# Patient Record
Sex: FEMALE | Race: WHITE | NOT HISPANIC OR LATINO | Employment: UNEMPLOYED | URBAN - METROPOLITAN AREA
[De-identification: names, ages, dates, MRNs, and addresses within clinical notes are randomized per-mention and may not be internally consistent; named-entity substitution may affect disease eponyms.]

---

## 2020-11-04 ENCOUNTER — TELEPHONE (OUTPATIENT)
Dept: NEUROLOGY | Facility: CLINIC | Age: 40
End: 2020-11-04

## 2020-11-05 ENCOUNTER — TELEPHONE (OUTPATIENT)
Dept: NEUROLOGY | Facility: CLINIC | Age: 40
End: 2020-11-05

## 2020-11-09 ENCOUNTER — TRANSCRIBE ORDERS (OUTPATIENT)
Dept: NEUROLOGY | Facility: CLINIC | Age: 40
End: 2020-11-09

## 2020-11-09 DIAGNOSIS — G40.909 EPILEPSY, UNSPECIFIED, NOT INTRACTABLE, WITHOUT STATUS EPILEPTICUS (HCC): Primary | ICD-10-CM

## 2020-11-10 ENCOUNTER — CONSULT (OUTPATIENT)
Dept: NEUROLOGY | Facility: CLINIC | Age: 40
End: 2020-11-10
Payer: COMMERCIAL

## 2020-11-10 ENCOUNTER — TELEPHONE (OUTPATIENT)
Dept: NEUROLOGY | Facility: CLINIC | Age: 40
End: 2020-11-10

## 2020-11-10 VITALS
BODY MASS INDEX: 20.03 KG/M2 | SYSTOLIC BLOOD PRESSURE: 113 MMHG | HEART RATE: 67 BPM | HEIGHT: 60 IN | DIASTOLIC BLOOD PRESSURE: 68 MMHG | TEMPERATURE: 98.2 F | WEIGHT: 102 LBS

## 2020-11-10 DIAGNOSIS — G40.B19 INTRACTABLE JUVENILE MYOCLONIC EPILEPSY WITHOUT STATUS EPILEPTICUS (HCC): ICD-10-CM

## 2020-11-10 DIAGNOSIS — F31.9 BIPOLAR I DISORDER (HCC): ICD-10-CM

## 2020-11-10 DIAGNOSIS — G40.909 EPILEPSY, UNSPECIFIED, NOT INTRACTABLE, WITHOUT STATUS EPILEPTICUS (HCC): Primary | ICD-10-CM

## 2020-11-10 DIAGNOSIS — Z96.89 S/P PLACEMENT OF VNS (VAGUS NERVE STIMULATION) DEVICE: ICD-10-CM

## 2020-11-10 DIAGNOSIS — G43.809 OTHER MIGRAINE WITHOUT STATUS MIGRAINOSUS, NOT INTRACTABLE: ICD-10-CM

## 2020-11-10 PROBLEM — N28.9 RENAL INSUFFICIENCY: Status: ACTIVE | Noted: 2019-06-11

## 2020-11-10 PROBLEM — F41.9 ANXIETY: Status: ACTIVE | Noted: 2019-09-24

## 2020-11-10 PROBLEM — H52.13 MYOPIA OF BOTH EYES: Status: ACTIVE | Noted: 2017-11-16

## 2020-11-10 PROBLEM — J45.909 ASTHMA: Status: ACTIVE | Noted: 2017-11-01

## 2020-11-10 PROBLEM — K21.9 GASTROESOPHAGEAL REFLUX DISEASE: Status: ACTIVE | Noted: 2017-11-01

## 2020-11-10 PROBLEM — G43.909 MIGRAINE: Status: ACTIVE | Noted: 2017-11-01

## 2020-11-10 PROBLEM — N18.30 STAGE 3 CHRONIC KIDNEY DISEASE (HCC): Status: ACTIVE | Noted: 2019-09-24

## 2020-11-10 PROBLEM — R80.9 PROTEINURIA: Status: ACTIVE | Noted: 2020-02-27

## 2020-11-10 PROCEDURE — 99245 OFF/OP CONSLTJ NEW/EST HI 55: CPT | Performed by: PSYCHIATRY & NEUROLOGY

## 2020-11-10 PROCEDURE — 99354 PR PROLONGED SVC OUTPATIENT SETTING 1ST HOUR: CPT | Performed by: PSYCHIATRY & NEUROLOGY

## 2020-11-10 RX ORDER — ZIPRASIDONE HYDROCHLORIDE 80 MG/1
160 CAPSULE ORAL EVERY EVENING
COMMUNITY
Start: 2020-10-23 | End: 2021-09-02

## 2020-11-10 RX ORDER — ECHINACEA PURPUREA EXTRACT 125 MG
2 TABLET ORAL AS NEEDED
COMMUNITY
Start: 2019-12-23

## 2020-11-10 RX ORDER — PERAMPANEL 4 MG/1
4 TABLET ORAL EVERY EVENING
Qty: 30 TABLET | Refills: 5 | Status: SHIPPED | OUTPATIENT
Start: 2020-11-10 | End: 2021-03-22

## 2020-11-10 RX ORDER — LAMOTRIGINE 200 MG/1
300 TABLET ORAL 2 TIMES DAILY
COMMUNITY
Start: 2020-11-05 | End: 2020-11-10

## 2020-11-10 RX ORDER — B-COMPLEX WITH VITAMIN C
1 TABLET ORAL
COMMUNITY
Start: 2020-09-04

## 2020-11-10 RX ORDER — ESOMEPRAZOLE MAGNESIUM 40 MG/1
40 CAPSULE, DELAYED RELEASE ORAL AS NEEDED
COMMUNITY
Start: 2020-11-03

## 2020-11-10 RX ORDER — LAMOTRIGINE 200 MG/1
200 TABLET ORAL 2 TIMES DAILY
Qty: 60 TABLET | Refills: 5 | Status: SHIPPED | OUTPATIENT
Start: 2020-11-10 | End: 2020-12-04

## 2020-11-10 RX ORDER — ZIPRASIDONE HYDROCHLORIDE 60 MG/1
160 CAPSULE ORAL
COMMUNITY
Start: 2020-11-06 | End: 2021-03-22

## 2020-11-10 RX ORDER — ZONISAMIDE 100 MG/1
CAPSULE ORAL 2 TIMES DAILY
COMMUNITY
Start: 2020-10-23 | End: 2020-11-10 | Stop reason: SDUPTHER

## 2020-11-10 RX ORDER — ZONISAMIDE 100 MG/1
200 CAPSULE ORAL 2 TIMES DAILY
Qty: 120 CAPSULE | Refills: 5 | Status: SHIPPED | OUTPATIENT
Start: 2020-11-10 | End: 2021-01-25

## 2020-11-10 RX ORDER — OMEPRAZOLE 40 MG/1
40 CAPSULE, DELAYED RELEASE ORAL
COMMUNITY
End: 2020-11-10

## 2020-11-10 RX ORDER — PSEUDOEPHED/ACETAMINOPH/DIPHEN 30MG-500MG
1000 TABLET ORAL EVERY 4 HOURS PRN
COMMUNITY
Start: 2020-09-01

## 2020-11-10 RX ORDER — DIVALPROEX SODIUM 500 MG/1
500 TABLET, EXTENDED RELEASE ORAL
Qty: 30 TABLET | Refills: 5 | Status: SHIPPED | OUTPATIENT
Start: 2020-11-10 | End: 2021-03-22 | Stop reason: SDUPTHER

## 2020-11-10 RX ORDER — PERAMPANEL 4 MG/1
4 TABLET ORAL EVERY EVENING
COMMUNITY
Start: 2020-10-30 | End: 2020-11-10

## 2020-11-10 RX ORDER — MEGESTROL ACETATE 40 MG/ML
10 SUSPENSION ORAL
COMMUNITY
End: 2020-11-10

## 2020-11-23 ENCOUNTER — TELEPHONE (OUTPATIENT)
Dept: NEUROLOGY | Facility: CLINIC | Age: 40
End: 2020-11-23

## 2020-11-25 DIAGNOSIS — Z96.89 S/P PLACEMENT OF VNS (VAGUS NERVE STIMULATION) DEVICE: ICD-10-CM

## 2020-11-25 DIAGNOSIS — G40.B19 INTRACTABLE JUVENILE MYOCLONIC EPILEPSY WITHOUT STATUS EPILEPTICUS (HCC): Primary | ICD-10-CM

## 2020-12-01 ENCOUNTER — APPOINTMENT (OUTPATIENT)
Dept: RADIOLOGY | Facility: CLINIC | Age: 40
End: 2020-12-01
Payer: COMMERCIAL

## 2020-12-01 ENCOUNTER — TRANSCRIBE ORDERS (OUTPATIENT)
Dept: LAB | Facility: CLINIC | Age: 40
End: 2020-12-01

## 2020-12-01 ENCOUNTER — APPOINTMENT (OUTPATIENT)
Dept: LAB | Facility: CLINIC | Age: 40
End: 2020-12-01
Payer: COMMERCIAL

## 2020-12-01 DIAGNOSIS — Z96.89 S/P PLACEMENT OF VNS (VAGUS NERVE STIMULATION) DEVICE: ICD-10-CM

## 2020-12-01 DIAGNOSIS — G40.B19 INTRACTABLE JUVENILE MYOCLONIC EPILEPSY WITHOUT STATUS EPILEPTICUS (HCC): ICD-10-CM

## 2020-12-01 DIAGNOSIS — E78.2 MODERATE MIXED HYPERLIPIDEMIA NOT REQUIRING STATIN THERAPY: Primary | ICD-10-CM

## 2020-12-01 DIAGNOSIS — G40.909 EPILEPSY, UNSPECIFIED, NOT INTRACTABLE, WITHOUT STATUS EPILEPTICUS (HCC): ICD-10-CM

## 2020-12-01 DIAGNOSIS — E78.2 MODERATE MIXED HYPERLIPIDEMIA NOT REQUIRING STATIN THERAPY: ICD-10-CM

## 2020-12-01 LAB
ALBUMIN SERPL BCP-MCNC: 4.3 G/DL (ref 3.5–5)
ALP SERPL-CCNC: 143 U/L (ref 46–116)
ALT SERPL W P-5'-P-CCNC: 65 U/L (ref 12–78)
ANION GAP SERPL CALCULATED.3IONS-SCNC: 4 MMOL/L (ref 4–13)
AST SERPL W P-5'-P-CCNC: 21 U/L (ref 5–45)
BILIRUB SERPL-MCNC: 0.32 MG/DL (ref 0.2–1)
BUN SERPL-MCNC: 12 MG/DL (ref 5–25)
CALCIUM SERPL-MCNC: 9.9 MG/DL (ref 8.3–10.1)
CHLORIDE SERPL-SCNC: 108 MMOL/L (ref 100–108)
CHOLEST SERPL-MCNC: 191 MG/DL (ref 50–200)
CO2 SERPL-SCNC: 26 MMOL/L (ref 21–32)
CREAT SERPL-MCNC: 1.05 MG/DL (ref 0.6–1.3)
GFR SERPL CREATININE-BSD FRML MDRD: 68 ML/MIN/1.73SQ M
GLUCOSE P FAST SERPL-MCNC: 91 MG/DL (ref 65–99)
HDLC SERPL-MCNC: 60 MG/DL
LDLC SERPL CALC-MCNC: 112 MG/DL (ref 0–100)
NONHDLC SERPL-MCNC: 131 MG/DL
POTASSIUM SERPL-SCNC: 3.8 MMOL/L (ref 3.5–5.3)
PROT SERPL-MCNC: 7.7 G/DL (ref 6.4–8.2)
SODIUM SERPL-SCNC: 138 MMOL/L (ref 136–145)
TRIGL SERPL-MCNC: 96 MG/DL

## 2020-12-01 PROCEDURE — 36415 COLL VENOUS BLD VENIPUNCTURE: CPT

## 2020-12-01 PROCEDURE — 80165 DIPROPYLACETIC ACID FREE: CPT

## 2020-12-01 PROCEDURE — 80053 COMPREHEN METABOLIC PANEL: CPT

## 2020-12-01 PROCEDURE — 72050 X-RAY EXAM NECK SPINE 4/5VWS: CPT

## 2020-12-01 PROCEDURE — 80061 LIPID PANEL: CPT

## 2020-12-01 PROCEDURE — 80175 DRUG SCREEN QUAN LAMOTRIGINE: CPT

## 2020-12-03 LAB
LAMOTRIGINE SERPL-MCNC: 21 UG/ML (ref 2–20)
VALPROATE FREE SERPL-MCNC: 6.3 UG/ML (ref 6–22)

## 2020-12-04 DIAGNOSIS — G40.B19 INTRACTABLE JUVENILE MYOCLONIC EPILEPSY WITHOUT STATUS EPILEPTICUS (HCC): Primary | ICD-10-CM

## 2020-12-04 RX ORDER — LAMOTRIGINE 150 MG/1
TABLET ORAL
Qty: 60 TABLET | Refills: 5 | Status: SHIPPED | OUTPATIENT
Start: 2020-12-04 | End: 2021-03-22 | Stop reason: SDUPTHER

## 2020-12-08 ENCOUNTER — HOSPITAL ENCOUNTER (OUTPATIENT)
Dept: RADIOLOGY | Age: 40
Discharge: HOME | End: 2020-12-08
Payer: COMMERCIAL

## 2020-12-08 DIAGNOSIS — G40.B19 INTRACTABLE JUVENILE MYOCLONIC EPILEPSY WITHOUT STATUS EPILEPTICUS (HCC): ICD-10-CM

## 2020-12-08 PROCEDURE — 70553 MRI BRAIN STEM W/O & W/DYE: CPT

## 2020-12-08 PROCEDURE — A9585 GADOBUTROL INJECTION: HCPCS | Performed by: PSYCHIATRY & NEUROLOGY

## 2020-12-08 PROCEDURE — G1004 CDSM NDSC: HCPCS

## 2020-12-08 RX ADMIN — GADOBUTROL 4 ML: 604.72 INJECTION INTRAVENOUS at 13:10

## 2020-12-09 ENCOUNTER — TELEPHONE (OUTPATIENT)
Dept: NEUROLOGY | Facility: CLINIC | Age: 40
End: 2020-12-09

## 2020-12-10 ENCOUNTER — TELEPHONE (OUTPATIENT)
Dept: NEUROLOGY | Facility: CLINIC | Age: 40
End: 2020-12-10

## 2020-12-18 ENCOUNTER — TELEPHONE (OUTPATIENT)
Dept: NEUROLOGY | Facility: CLINIC | Age: 40
End: 2020-12-18

## 2020-12-18 ENCOUNTER — LAB (OUTPATIENT)
Dept: LAB | Facility: CLINIC | Age: 40
End: 2020-12-18
Payer: COMMERCIAL

## 2020-12-18 DIAGNOSIS — G40.B19 INTRACTABLE JUVENILE MYOCLONIC EPILEPSY WITHOUT STATUS EPILEPTICUS (HCC): ICD-10-CM

## 2020-12-18 PROCEDURE — 80165 DIPROPYLACETIC ACID FREE: CPT

## 2020-12-18 PROCEDURE — 80203 DRUG SCREEN QUANT ZONISAMIDE: CPT

## 2020-12-18 PROCEDURE — 36415 COLL VENOUS BLD VENIPUNCTURE: CPT

## 2020-12-18 PROCEDURE — 80175 DRUG SCREEN QUAN LAMOTRIGINE: CPT

## 2020-12-21 LAB
LAMOTRIGINE SERPL-MCNC: 14 UG/ML (ref 2–20)
VALPROATE FREE SERPL-MCNC: 4.9 UG/ML (ref 6–22)
ZONISAMIDE SERPL-MCNC: 29.2 UG/ML (ref 10–40)

## 2020-12-22 ENCOUNTER — TELEPHONE (OUTPATIENT)
Dept: NEUROLOGY | Facility: CLINIC | Age: 40
End: 2020-12-22

## 2021-01-11 ENCOUNTER — TELEPHONE (OUTPATIENT)
Dept: NEUROLOGY | Facility: CLINIC | Age: 41
End: 2021-01-11

## 2021-01-11 NOTE — TELEPHONE ENCOUNTER
Called and spoke to patient -  patient confirmed apt with Dr Yaritza Hyman  Patient has no issues or concerns at this time

## 2021-01-25 ENCOUNTER — OFFICE VISIT (OUTPATIENT)
Dept: NEUROLOGY | Facility: CLINIC | Age: 41
End: 2021-01-25
Payer: COMMERCIAL

## 2021-01-25 VITALS
HEIGHT: 59 IN | BODY MASS INDEX: 23.67 KG/M2 | RESPIRATION RATE: 18 BRPM | WEIGHT: 117.4 LBS | DIASTOLIC BLOOD PRESSURE: 72 MMHG | HEART RATE: 79 BPM | SYSTOLIC BLOOD PRESSURE: 113 MMHG

## 2021-01-25 DIAGNOSIS — G43.809 OTHER MIGRAINE WITHOUT STATUS MIGRAINOSUS, NOT INTRACTABLE: ICD-10-CM

## 2021-01-25 DIAGNOSIS — G40.B19 INTRACTABLE JUVENILE MYOCLONIC EPILEPSY WITHOUT STATUS EPILEPTICUS (HCC): Primary | ICD-10-CM

## 2021-01-25 DIAGNOSIS — N28.9 RENAL INSUFFICIENCY: ICD-10-CM

## 2021-01-25 DIAGNOSIS — G40.909 EPILEPSY, UNSPECIFIED, NOT INTRACTABLE, WITHOUT STATUS EPILEPTICUS (HCC): ICD-10-CM

## 2021-01-25 DIAGNOSIS — F31.9 BIPOLAR I DISORDER (HCC): ICD-10-CM

## 2021-01-25 PROCEDURE — 99214 OFFICE O/P EST MOD 30 MIN: CPT | Performed by: PSYCHIATRY & NEUROLOGY

## 2021-01-25 RX ORDER — ZONISAMIDE 100 MG/1
CAPSULE ORAL
Qty: 84 CAPSULE | Refills: 0 | Status: SHIPPED | OUTPATIENT
Start: 2021-01-25 | End: 2021-03-22

## 2021-01-25 RX ORDER — DIVALPROEX SODIUM 250 MG/1
250 TABLET, EXTENDED RELEASE ORAL
Qty: 30 TABLET | Refills: 5 | Status: SHIPPED | OUTPATIENT
Start: 2021-01-25 | End: 2021-03-22
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
## 2021-01-25 NOTE — PATIENT INSTRUCTIONS
Plan:   1 - Take Divalproex (Depakote) ER 500mg and 250mg one tab each at bedtime  2 - Continue with lamotrigine at 150mg twice a day  3 - continue with Fycompa 4mg tab 1 tab at bedtime  4 - decrease Zonisamide to three 100mg caps at bedtime for 2 weeks, then two caps at bedtime for 2 weeks, then one cap at bedtime for 2 weeks then stop  5 - in one month check lamotrigine and valproic acid free level  6 - call the office if you have breakthrough seizures, if so will increase divalproex dose  7 - follow-up in 2 months with SHANNAN and Dr Tejal Crespo in 4 months

## 2021-01-26 PROBLEM — G40.909 EPILEPSY, UNSPECIFIED, NOT INTRACTABLE, WITHOUT STATUS EPILEPTICUS (HCC): Status: RESOLVED | Noted: 2021-01-26 | Resolved: 2021-01-26

## 2021-01-26 PROBLEM — G40.909 EPILEPSY, UNSPECIFIED, NOT INTRACTABLE, WITHOUT STATUS EPILEPTICUS (HCC): Status: ACTIVE | Noted: 2021-01-26

## 2021-02-15 ENCOUNTER — TELEPHONE (OUTPATIENT)
Dept: NEUROLOGY | Facility: CLINIC | Age: 41
End: 2021-02-15

## 2021-02-15 DIAGNOSIS — R10.9 ABDOMINAL PAIN: ICD-10-CM

## 2021-02-15 DIAGNOSIS — G40.B19 INTRACTABLE JUVENILE MYOCLONIC EPILEPSY WITHOUT STATUS EPILEPTICUS (HCC): Primary | ICD-10-CM

## 2021-02-15 NOTE — TELEPHONE ENCOUNTER
Pt called and states that Dr Nhan Vang increased her depakote from 500 mg to 750 mg at bedtime  For the past 2 weeks, she has not been feeling well, extremely weak and tired  1 5 week ago, her stomach started to hurt  Since Dr Nhan Vang increased depakote, her symptoms has been constant  Current meds  depakote 750 mg daily   zonisamide 100 mg 2 tab bedtime (week 2)  Lamictal 150 mg bid  fycompa 4 mg at bedtime    No episode of seizure  No recent illness  No new meds or other med changes   Requesting to speak w/ either Dr Nhan Vang or Claude Brunt                               [phone] ok to leave detailed message but "I will answer"

## 2021-02-15 NOTE — TELEPHONE ENCOUNTER
Please ask if she can go for blood work to check liver function, CBC, amylase, lipase, along with the previously written scripts for lamotrigine and valproic acid levels  Try to get blood work completed first   After blood work is drawn then she can decrease Divalproex back to 500mg at bedtime  I want to see how high is her lamotrigine level  If lamotrigine is excessively high then will reduce dose of lamotrigine if we try to increase Divalproex again  She may continue to reduce her dose of zonisamide as previously instructed

## 2021-02-16 ENCOUNTER — TELEPHONE (OUTPATIENT)
Dept: NEUROLOGY | Facility: CLINIC | Age: 41
End: 2021-02-16

## 2021-02-16 NOTE — TELEPHONE ENCOUNTER
Patient called the [facility] to request to speak to Dr Dina Mcclelland regarding her concerns with her Depakote dosage  She stated her Depakote was increased to 750 mg daily but for the past 2 weeks it has caused her to have abdominal pain so she is only taking 500 mg daily  I explained to her that Dr Dina Mcclelland had put in orders for lab work that he wanted her to have done which is a factor to reviewing her medication dosage levels  She agreed to going and having her lab work done

## 2021-02-16 NOTE — TELEPHONE ENCOUNTER
Patient called after hours line  Patient will go and get her labs done tomorrow  Patient also admits that she is likely more dehydrated and does not eat with her morning medications  Advised to drink more water and less soda and that she could eat something small when taking her morning medication  Patient verbalized understanding  All patient questions answered and I advised we would f/u when her labs come in      Dr Lazaro Smith, [facility]

## 2021-02-17 ENCOUNTER — LAB (OUTPATIENT)
Dept: LAB | Facility: CLINIC | Age: 41
End: 2021-02-17
Payer: COMMERCIAL

## 2021-02-17 DIAGNOSIS — R10.9 ABDOMINAL PAIN: ICD-10-CM

## 2021-02-17 DIAGNOSIS — G40.B19 INTRACTABLE JUVENILE MYOCLONIC EPILEPSY WITHOUT STATUS EPILEPTICUS (HCC): ICD-10-CM

## 2021-02-17 DIAGNOSIS — G40.909 EPILEPSY, UNSPECIFIED, NOT INTRACTABLE, WITHOUT STATUS EPILEPTICUS (HCC): ICD-10-CM

## 2021-02-17 LAB
ALBUMIN SERPL BCP-MCNC: 4.1 G/DL (ref 3.5–5)
ALP SERPL-CCNC: 99 U/L (ref 46–116)
ALT SERPL W P-5'-P-CCNC: 29 U/L (ref 12–78)
AMYLASE SERPL-CCNC: 64 IU/L (ref 25–115)
AST SERPL W P-5'-P-CCNC: 26 U/L (ref 5–45)
BILIRUB DIRECT SERPL-MCNC: 0.07 MG/DL (ref 0–0.2)
BILIRUB SERPL-MCNC: 0.24 MG/DL (ref 0.2–1)
ERYTHROCYTE [DISTWIDTH] IN BLOOD BY AUTOMATED COUNT: 14 % (ref 11.6–15.1)
HCT VFR BLD AUTO: 39.8 % (ref 34.8–46.1)
HGB BLD-MCNC: 12.6 G/DL (ref 11.5–15.4)
LIPASE SERPL-CCNC: 107 U/L (ref 73–393)
MCH RBC QN AUTO: 29.7 PG (ref 26.8–34.3)
MCHC RBC AUTO-ENTMCNC: 31.7 G/DL (ref 31.4–37.4)
MCV RBC AUTO: 94 FL (ref 82–98)
PLATELET # BLD AUTO: 237 THOUSANDS/UL (ref 149–390)
PMV BLD AUTO: 10.2 FL (ref 8.9–12.7)
PROT SERPL-MCNC: 7.2 G/DL (ref 6.4–8.2)
RBC # BLD AUTO: 4.24 MILLION/UL (ref 3.81–5.12)
WBC # BLD AUTO: 9.03 THOUSAND/UL (ref 4.31–10.16)

## 2021-02-17 PROCEDURE — 82150 ASSAY OF AMYLASE: CPT

## 2021-02-17 PROCEDURE — 85027 COMPLETE CBC AUTOMATED: CPT

## 2021-02-17 PROCEDURE — 80175 DRUG SCREEN QUAN LAMOTRIGINE: CPT

## 2021-02-17 PROCEDURE — 36415 COLL VENOUS BLD VENIPUNCTURE: CPT

## 2021-02-17 PROCEDURE — 80165 DIPROPYLACETIC ACID FREE: CPT

## 2021-02-17 PROCEDURE — 83690 ASSAY OF LIPASE: CPT

## 2021-02-17 PROCEDURE — 80076 HEPATIC FUNCTION PANEL: CPT

## 2021-02-19 LAB
LAMOTRIGINE SERPL-MCNC: 11.2 UG/ML (ref 2–20)
VALPROATE FREE SERPL-MCNC: 5.6 UG/ML (ref 6–22)

## 2021-02-19 NOTE — TELEPHONE ENCOUNTER
Pt called and states that she got bw done 2 days ago  Advised pt that lamotrigine and depakote level still in process  Requesting a call back w/ final results       See lab results        [phone] ok to leave detailed message

## 2021-02-22 NOTE — TELEPHONE ENCOUNTER
LFT and enzymes are normal   Lamotrigine level is not excessive  Valproic acid level is still low; not significantly high compared to prior dose  Component      Latest Ref Rng & Units 12/1/2020 12/18/2020 2/17/2021   Valproic Acid, Free      6 0 - 22 0 ug/mL 6 3  5 6 (L)   LAMOTRIGINE LEVEL      2 0 - 20 0 ug/mL 21 0 (HH) 14 0 11 2       Did she reduce her Divalproex to 500mg at bedtime? Is she feeling better? Is there still abdominal pain? If she is feeling better, we can try her going back up on Depakote to 750mg daily  No change to lamotrigine dose  May continue to wean off of zonisamide  Let us know if abdominal pain returns after divalproex dose has been increased

## 2021-02-22 NOTE — TELEPHONE ENCOUNTER
pt made aware  she did reduced divalproex to 500mg bedtime  she is feeling better  no abdominal pain  all side effects resolved once she decreased to 500mg bedtime  her sister told her that when she previously increased depakote years ago, she had has these same symptoms and she had seizures on higher dose  she states that after she decreased dose last week  she started to space out again  occuring 2-3 times a day, not every day  episodes last 10-20 sec   occured maybe 3 days in the last week  No missed  she is not comfortable increasing depakote again as she does not want to have the side effects again     depakote er 500mg 1 tab hs  lamotrigine 150mg 1 tab bid   zonisamide 100mg 2 tabs hs- she will decrease to 100mg 1 tab hs this thursday  please advise

## 2021-02-22 NOTE — TELEPHONE ENCOUNTER
I would like her to try a higher dose of Divalproex again, given the recurrent seizures she has been experiencing  She may feel better once zonisamide is out of her system  She can try decreasing zonisamide to 100mg today/Tuesday for 5 days, then stop zonisamide sooner than expected and restart Divalproex 750mg at bedtime  If she had blood work on 2/17/2021 with the higher dose of Divalproex, then her level is actually lower than before

## 2021-02-23 NOTE — TELEPHONE ENCOUNTER
Spoke to patient  She is not willing to increase depakote due to prior events and how she is feeling  States she will stay on 500mg dose but will come off zonisamide  Informed patient that this will likely not control seizures, beings her levels are below the normal expected range  Patient verbalized understanding but states she does not want to increase depakote any higher than what she already is taking  States she will call our office if she has any additional seizures  Dr Mervin HARVEY

## 2021-03-17 ENCOUNTER — TELEPHONE (OUTPATIENT)
Dept: NEUROLOGY | Facility: CLINIC | Age: 41
End: 2021-03-17

## 2021-03-17 NOTE — TELEPHONE ENCOUNTER
Called and spoke to patient -  patient confirmed upcoming apt with Dr Jeffries on 3/22/21 @ 9:30 am  Patient has no issues or concerns at this time

## 2021-03-22 ENCOUNTER — OFFICE VISIT (OUTPATIENT)
Dept: NEUROLOGY | Facility: CLINIC | Age: 41
End: 2021-03-22
Payer: COMMERCIAL

## 2021-03-22 VITALS
BODY MASS INDEX: 23.29 KG/M2 | RESPIRATION RATE: 18 BRPM | HEART RATE: 78 BPM | SYSTOLIC BLOOD PRESSURE: 116 MMHG | HEIGHT: 60 IN | DIASTOLIC BLOOD PRESSURE: 67 MMHG | WEIGHT: 118.6 LBS

## 2021-03-22 DIAGNOSIS — G40.909 EPILEPSY, UNSPECIFIED, NOT INTRACTABLE, WITHOUT STATUS EPILEPTICUS (HCC): ICD-10-CM

## 2021-03-22 DIAGNOSIS — F31.9 BIPOLAR I DISORDER (HCC): ICD-10-CM

## 2021-03-22 DIAGNOSIS — G40.B19 INTRACTABLE JUVENILE MYOCLONIC EPILEPSY WITHOUT STATUS EPILEPTICUS (HCC): Primary | ICD-10-CM

## 2021-03-22 DIAGNOSIS — G43.809 OTHER MIGRAINE WITHOUT STATUS MIGRAINOSUS, NOT INTRACTABLE: ICD-10-CM

## 2021-03-22 PROCEDURE — 99215 OFFICE O/P EST HI 40 MIN: CPT | Performed by: PSYCHIATRY & NEUROLOGY

## 2021-03-22 RX ORDER — LAMOTRIGINE 150 MG/1
TABLET ORAL
Qty: 60 TABLET | Refills: 5 | Status: SHIPPED | OUTPATIENT
Start: 2021-03-22 | End: 2021-09-13 | Stop reason: SDUPTHER

## 2021-03-22 RX ORDER — DIVALPROEX SODIUM 500 MG/1
500 TABLET, EXTENDED RELEASE ORAL
Qty: 30 TABLET | Refills: 5 | Status: SHIPPED | OUTPATIENT
Start: 2021-03-22 | End: 2021-09-13 | Stop reason: SDUPTHER

## 2021-03-22 RX ORDER — PERAMPANEL 2 MG/1
1 TABLET ORAL
Qty: 30 TABLET | Refills: 5 | Status: SHIPPED | OUTPATIENT
Start: 2021-03-22 | End: 2021-05-26

## 2021-03-22 NOTE — PATIENT INSTRUCTIONS
Plan:   1 - Continue with Divalproex ER 500mg daily at bedtime  2 - Continue with lamotrigine at 150mg twice a day  3 - continue with Fycompa 4mg tab 1 tab at bedtime until no more then change to Fycompa 2mg tab one tab at bedtime  4 - speak to your psychiatrist about lowering Geodon to as minimal dose as possible to manage your bipolar disorder; higher doses of antipsychotic medications can lower your seizure threshold  I am reducing Fycompa to minimize side effects of medications but lowering Fycompa will increase your risk for having more seizures  We hit a limit as to how high we can go with Divalproex and lamotrigine  5 - call the office if you have breakthrough seizures, if so will increase divalproex dose  6 - follow-up in 3 months with SHANNAN and Dr Amparo Blackwood in 6 months    Please let us know if your psychiatrist is going to lower your dose of Geodon or not

## 2021-03-22 NOTE — PROGRESS NOTES
Review of Systems   Constitutional: Negative  Negative for appetite change and fever  HENT: Negative  Negative for hearing loss, tinnitus, trouble swallowing and voice change  Eyes: Negative  Negative for photophobia and pain  Respiratory: Positive for shortness of breath  Cardiovascular: Negative  Negative for palpitations  Gastrointestinal: Negative  Negative for nausea and vomiting  Endocrine: Negative  Negative for cold intolerance  Genitourinary: Negative  Negative for dysuria, frequency and urgency  Musculoskeletal: Negative  Negative for myalgias and neck pain  Skin: Negative  Negative for rash  Allergic/Immunologic: Negative  Neurological: Negative  Negative for dizziness, tremors, seizures, syncope, facial asymmetry, speech difficulty, weakness, light-headedness, numbness and headaches  Hematological: Bruises/bleeds easily  Psychiatric/Behavioral: Positive for confusion  Negative for hallucinations  The patient is nervous/anxious           Bipolar, mood swings, memory issues
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
## 2021-03-26 ENCOUNTER — TELEPHONE (OUTPATIENT)
Dept: NEUROLOGY | Facility: CLINIC | Age: 41
End: 2021-03-26

## 2021-03-26 NOTE — TELEPHONE ENCOUNTER
Patient calling to inform you that her psychiatrist lowered her Jaden Gaines Hernandez that she is now taking 60 mg BID

## 2021-05-24 DIAGNOSIS — G40.B19 INTRACTABLE JUVENILE MYOCLONIC EPILEPSY WITHOUT STATUS EPILEPTICUS (HCC): ICD-10-CM

## 2021-05-24 NOTE — TELEPHONE ENCOUNTER
Call received from Tamela Berg with [facility] COVID vaccine clinic  Patient had seizure this morning  She is there for second dose of Moderna vaccine, but need okay from provider to get vaccine  TT sent to Dr Jeffries  Response received back from Dr Jeffries - "Yes, that is fine to give her vaccine "    Call placed back to Tamela Berg  Informed her of response  Also requested she have patient call office to provide information regarding seizure  Postponing encounter to follow up in regards to seizure reporting  Tamela Berg - [phone]

## 2021-05-26 NOTE — TELEPHONE ENCOUNTER
Followed up with patient since she did not contact office  Stated she had grand mal seizure  Patient was in the bathroom, came downstiars almost fell against fish tank  caught by   Seizure was witnessed by   Lasted about 1 minute  No injuries  Patient reports only headache after event  Patient does not remember event details, but does recall walking around not sure what she was actually doing  No signals or auras prior to event  Patient states she did miss a few doses of depakote due to issue with pharmacy and transportation to  medication   Advised patient if there are issues in the future to let our office know and we can possibly assist      Medications confirmed:  depakote 500mg at bedtime  lamotrigine 150mg twice per day  fycompa 2mg at bedtime

## 2021-05-26 NOTE — TELEPHONE ENCOUNTER
Spoke to patient  Agreeable to fycompa increase  Please send to [facility] [place]

## 2021-05-26 NOTE — TELEPHONE ENCOUNTER
Patient said last time she had increased dose of depakote (about 14years of age) it made her have seizures  She does not want to take chance again  She is not agreeable to increasing depakote dose  Patient stated she cannot talk about this any more as she was busy and disconnected call  Would you still like patient to get labs? Per initial call, it sounds like patient was unable to get ride to pharmacy to  medication

## 2021-05-26 NOTE — TELEPHONE ENCOUNTER
No need for blood work if she is not willing to change her dose of medication  She told me that depakote worked better for her seizures but at higher doses she could not tolerate it because of abdominal pain/symptoms  Then will recommend that she increase Fycompa back to 4mg daily

## 2021-05-26 NOTE — TELEPHONE ENCOUNTER
I would recommend at least increasing her Divalproex to 500mg twice a day  This would give her a higher blood level, should she miss doses  She should always call the office if there are issues with medication (if it was on back order or something we can substitute a different formulation of divalproex ER versus EC)  Will need blood work in about a couple of weeks to monitor levels of lamotrigine valproic acid (free), LFT, CBC

## 2021-06-03 ENCOUNTER — TELEPHONE (OUTPATIENT)
Dept: NEUROLOGY | Facility: CLINIC | Age: 41
End: 2021-06-03

## 2021-06-03 DIAGNOSIS — G40.B19 INTRACTABLE JUVENILE MYOCLONIC EPILEPSY WITHOUT STATUS EPILEPTICUS (HCC): Primary | ICD-10-CM

## 2021-06-03 NOTE — TELEPHONE ENCOUNTER
Patient calling to say that it was recommended that if she has a seizure she needs to call the office to schedule a sooner appointment  This is in regards to seizure on 5/24  Patient reported that she is having around 5 petite mal seizures a day  Describes them as staring episodes with loss of awareness during these staring episode  Patient is scheduled with Ashly on 6/28  Would you like her to come in sooner? Patient also requesting a letter detailing the need for her to have a service dog  Are you agreeable?

## 2021-06-03 NOTE — TELEPHONE ENCOUNTER
She needs an EEG study to determine the frequency of her epileptiform discharges  She will need a routine EEG first, followed by 48 hours ambulatory EEG  On the ambulatory EEG study she needs to press the event button when she feels that she is having "petit mal" seizures  You may write a letter in support of a support dog  Would she be willing to increase divalproex to 750mg daily?

## 2021-06-03 NOTE — LETTER
Sandra 3, 2021     Patient: Eliane Sellers   YOB: 1980           To Whom it May Concern:    Eliane Sellers is under my professional care  She is diagnosed with an Epilepsy disorder  It would be beneficial for the patient to have a service animal that is trained to protect and/or alert someone if the patient is alone and has a seizure  If you have any questions or concerns, please don't hesitate to call           Sincerely,          Julio Casey MD

## 2021-06-03 NOTE — LETTER
21    RE: Eliane Sellers  :  1980    To Whom it May Concern:     Eliane Bolaños is under my professional care  She is diagnosed with an Epilepsy disorder   It would be beneficial for the patient to have a service animal that is trained to protect and/or alert someone if the patient is alone and has a seizure      If you have any questions or concerns, please don't hesitate to call            Sincerely,            Lizett Gonzalez MD

## 2021-06-03 NOTE — TELEPHONE ENCOUNTER
Called and discussed recommendations with patient  She is agreeable to EEG testing, but is not agreeable to increasing her depakote  She did increase her Fycompa to 4 mg as recommended  Please place EEG orders and will call patient back to assist with scheduling  Requesting letter be emailed to email on file  Please review  To Whom it May Concern:    Eliane Sellers is under my professional care  She is diagnosed with an Epilepsy disorder  It would be beneficial for the patient to have a service animal that is trained to protect and/or alert someone if the patient is alone and has a seizure  If you have any questions or concerns, please don't hesitate to call           Sincerely,          Nava Irizarry MD

## 2021-06-05 ENCOUNTER — TELEPHONE (OUTPATIENT)
Dept: OTHER | Facility: OTHER | Age: 41
End: 2021-06-05

## 2021-06-05 NOTE — TELEPHONE ENCOUNTER
Pt called to verify the status of a document that needs to be signed by doctor for a pet for emotional support  Patient was suppose to receive it via e-mail but has not received it yet   Pt would like to receive a call back form office at their best convenience to verify and confirm status of document

## 2021-06-07 NOTE — TELEPHONE ENCOUNTER
Letter was faxed on 6/4/2021  Attempt both home and mobile numbers on file to confirm email address  No answer  Message left at home number, unable to leave message on mobile as voicemail has not yet been set up  Letter emailed again to email on file ([email or web address])

## 2021-08-02 ENCOUNTER — TELEPHONE (OUTPATIENT)
Dept: NEUROLOGY | Facility: CLINIC | Age: 41
End: 2021-08-02

## 2021-08-02 NOTE — TELEPHONE ENCOUNTER
Patient calling to state she "almost" had a grand mal seizure  Symptoms of stumbling, sweating, full body twitching  She was at work  Employer is requesting a letter regarding work status and if patient is okay to work  Please advise  Patient states this event happened saturday  Possibly missed doses of medications Friday night  Patient denies difficulty sleeping, only stress in return to work - sometimes only getting 4 hours of sleep at night  Patient also reporting 2 grand mal seizures in July  Patient was not seen in ED for evaluation  Medications confirmed:  Fycompa 4mg at bedtime  Depakote ER 500mg at bedtime  lamotrigine 150mg BID    Requesting letter mailed to home address

## 2021-08-02 NOTE — TELEPHONE ENCOUNTER
Last time she was in the office she reported that she was disabled due to her seizures  Please clarify what type of work does she do? What are her work hours? What company does she work for? How often are her seizures? Have the seizures improved in frequency with the addition of Fycompa? Was she compliant with her medications back in July when she had the two grand mal seizures? Prior to working again, how much sleep was she getting before?

## 2021-08-03 NOTE — TELEPHONE ENCOUNTER
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 4 am-5 am start and 12 pm-1pm end  What company does she work for? [employer]     How often are her seizures? 1 seizure in May, 1 seizure in June, and 2 in July  Stated that she feels like she is having more frequent starring spells  Have the seizures improved in frequency with the addition of Fycompa? Said that she thinks Depakote is causing her seizures to become more frequent  Was she compliant with her medications back in July when she had the two grand mal seizures? Yes     Prior to working again, how much sleep was she getting before? 8-9 hours  4-5 hours when she was working

## 2021-08-03 NOTE — TELEPHONE ENCOUNTER
Patient returning call  Said that she wanted to mention an issue with her memory  Said that she noticed having problems with her STM where she would be talking about something and then forget what she was talking about  Sun [place] it is like she goes blank and spaces out  Unsure if these are petite mal seizures  Sun [place] that she feels this is getting worse

## 2021-08-03 NOTE — TELEPHONE ENCOUNTER
Patient has too many concerns to address over the phone  She needs to schedule an office visit  She has not completed EEG studies  She has to complete routine and 48 hours ambulatory EEG studies  One concern is that she maybe having absence seizures  This can be identified with ambulatory EEG study  Unable to assist if she does not complete these studies    Please schedule an urgent visit with me for 8/11/2021 at 1PM

## 2021-08-05 NOTE — TELEPHONE ENCOUNTER
Patient returning call  She is agreeable to office visit  Scheduled for 8/11 at 1 pm  Fawad eeg is scheduled for this coming Monday

## 2021-08-09 ENCOUNTER — TELEPHONE (OUTPATIENT)
Dept: NEUROLOGY | Facility: CLINIC | Age: 41
End: 2021-08-09

## 2021-08-09 ENCOUNTER — HOSPITAL ENCOUNTER (OUTPATIENT)
Dept: NEUROLOGY | Facility: HOSPITAL | Age: 41
Discharge: HOME | End: 2021-08-09
Attending: PSYCHIATRY & NEUROLOGY
Payer: COMMERCIAL

## 2021-08-09 DIAGNOSIS — G40.B19 INTRACTABLE JUVENILE MYOCLONIC EPILEPSY WITHOUT STATUS EPILEPTICUS (HCC): ICD-10-CM

## 2021-08-09 DIAGNOSIS — G40.B19 INTRACTABLE JUVENILE MYOCLONIC EPILEPSY WITHOUT STATUS EPILEPTICUS (HCC): Primary | ICD-10-CM

## 2021-08-09 PROCEDURE — 95816 EEG AWAKE AND DROWSY: CPT

## 2021-08-09 PROCEDURE — 95816 EEG AWAKE AND DROWSY: CPT | Performed by: PSYCHIATRY & NEUROLOGY

## 2021-08-09 RX ORDER — CLONAZEPAM 0.5 MG/1
0.5 TABLET ORAL 2 TIMES DAILY
Qty: 60 TABLET | Refills: 5 | Status: SHIPPED | OUTPATIENT
Start: 2021-08-09 | End: 2021-09-02

## 2021-08-09 NOTE — TELEPHONE ENCOUNTER
Called the patient today after she completed her EEG study  EEG study shows frequent runs of generalized epileptiform discharges  At one point, she was instructed to count by 2s and she had difficulty remembering where she was on the count  Options discussed with the patient is to start with clonazepam 0 5mg take one tab now and at bedtime, then starting tomorrow twice a day  This is a temporizing measure until we get her on a more prolonged regimen of medication  Side effects of sedation and irritability were discussed  She denies a history of benzodiazepine abuse or dependency  She was on Ativan for her anxiety some time ago  She has a follow-up visit with me in a couple of days  We will discussed either increasing Perampanel dose or transitioning clonazepam to [facility] for more long term use of benzodiazepine  She is okay with either increasing Perampanel to 6mg daily or starting Onfi  The [place] was queried  No red flags were identified  The patient is low risk for abuse of the prescribed clonazepam medication  Safe to proceed with prescription

## 2021-08-11 ENCOUNTER — OFFICE VISIT (OUTPATIENT)
Dept: NEUROLOGY | Facility: CLINIC | Age: 41
End: 2021-08-11
Payer: COMMERCIAL

## 2021-08-11 VITALS
BODY MASS INDEX: 23.95 KG/M2 | HEIGHT: 60 IN | RESPIRATION RATE: 20 BRPM | WEIGHT: 122 LBS | HEART RATE: 96 BPM | TEMPERATURE: 98.3 F | SYSTOLIC BLOOD PRESSURE: 108 MMHG | DIASTOLIC BLOOD PRESSURE: 68 MMHG

## 2021-08-11 DIAGNOSIS — G40.B19 INTRACTABLE JUVENILE MYOCLONIC EPILEPSY WITHOUT STATUS EPILEPTICUS (HCC): Primary | ICD-10-CM

## 2021-08-11 PROCEDURE — 99215 OFFICE O/P EST HI 40 MIN: CPT | Performed by: PSYCHIATRY & NEUROLOGY

## 2021-08-11 RX ORDER — ZIPRASIDONE HYDROCHLORIDE 60 MG/1
80 CAPSULE ORAL 2 TIMES DAILY
COMMUNITY
Start: 2021-07-29

## 2021-08-11 RX ORDER — PERAMPANEL 6 MG/1
6 TABLET ORAL
Qty: 30 TABLET | Refills: 5 | Status: SHIPPED | OUTPATIENT
Start: 2021-08-11 | End: 2021-09-02

## 2021-08-11 NOTE — TELEPHONE ENCOUNTER
----- Message from Gucci Leblnac MD sent at 8/11/2021  1:45 PM EDT -----  Regarding: Fycompa increase dose please find out if [place] is required  Patient is having intractable absence seizures  Require higher dose of Fycompa  Please find out if Fycompa requires [place] - recommend getting authorization for 6mg and 8mg tabs  Patient will try a higher dose but call in 2 weeks to report if there is any improvement in symptoms      Koby Ralph

## 2021-08-11 NOTE — PATIENT INSTRUCTIONS
Plan:   1 - Continue with Divalproex ER 500mg daily at bedtime  2 - Continue with lamotrigine 150mg twice a day  3 - Increase Fycompa to 6mg daily at bedtime  4 - try to take clonazepam 0 5mg tab half a tab twice a day for 1 week, let us know if you are unable to tolerate this medication  5 - check lamotrigine, valproic acid free level and LFT  6 - call the office back after taking Fycompa 6mg daily for 2 weeks to report if symptoms get better  If not better than increase Fycompa to 8mg daily  7 - follow-up with SHANNAN in about 6 weeks and Dr Carissa Corea in 3 months  8 - may consider trying applying for short term disability due to frequent absence seizures    Forward to my office if you require any documentation for short term disability  8 - continue with 48 hours ambulatory EEG study
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
## 2021-08-11 NOTE — PROGRESS NOTES
Review of Systems   Constitutional: Positive for appetite change (Increased when started on Klonopin-stopped med )  Negative for fever  HENT: Negative  Negative for hearing loss, tinnitus, trouble swallowing and voice change  Eyes: Positive for photophobia  Negative for pain  Respiratory: Positive for shortness of breath  Cardiovascular: Negative for chest pain and palpitations  Gastrointestinal: Negative  Negative for nausea and vomiting  Endocrine: Negative  Negative for cold intolerance  Genitourinary: Negative  Negative for dysuria, frequency and urgency  Musculoskeletal: Negative  Negative for myalgias and neck pain  Skin: Negative  Negative for rash  Neurological: Positive for seizures (several times daily-last Gailen Marlene Mal seizure in July)  Negative for dizziness, tremors, syncope, facial asymmetry, speech difficulty, weakness, light-headedness, numbness and headaches  Hematological: Negative  Does not bruise/bleed easily  Psychiatric/Behavioral: Positive for confusion  Negative for hallucinations and sleep disturbance

## 2021-08-11 NOTE — TELEPHONE ENCOUNTER
----- Message from Marsha Elias MD sent at 8/11/2021  1:45 PM EDT -----  Regarding: Fycompa increase dose please find out if [place] is required  Patient is having intractable absence seizures  Require higher dose of Fycompa  Please find out if Fycompa requires [place] - recommend getting authorization for 6mg and 8mg tabs  Patient will try a higher dose but call in 2 weeks to report if there is any improvement in symptoms      Laurel Schulte

## 2021-08-17 NOTE — TELEPHONE ENCOUNTER
Called and discussed with patient  Said that she was not taking clonazepam with fycompa  Said that she was told that the combination would make her tired  Patient states that she will be getting her blood work completed tomorrow

## 2021-08-17 NOTE — TELEPHONE ENCOUNTER
Patient is agreeable to alternating her fycompa doses with 4 and 6 mg tabs  She does not want to add another AED  She did confirm that she is still having periods of memory loss and cognitive difficulty  Advised that she call the office next week to update

## 2021-08-17 NOTE — TELEPHONE ENCOUNTER
Was she also taking clonazepam 0 5mg half a tab twice a day at the same time as recommended from last office visit? I had also requested that she gets blood work completed for valproic acid level, lamotrigine level, and LFTs

## 2021-08-17 NOTE — TELEPHONE ENCOUNTER
Patient calling to say that the fycompa 6 mg is making her too tired  States that she was sleeping for 11 5 hours at night  Wanted to inform you that she went back down to 4 mg qhs last night  Said that she does not feel tired today and feels perfectly fine  Advised that it may take sometime until her body adjusts to the increased medication  States Galina Ruvalcaba that she started this dose last Thursday or Friday      Please advise

## 2021-08-17 NOTE — TELEPHONE ENCOUNTER
Does she have perampanel 4mg and 6mg tabs at home? I would like her to try the higher dose of perampanel, it may take more time for her to get adjusted to the higher dose  If she has 4mg and 6mg tabs, try alternating doses every night for 8 days, then go to 6mg nightly  Sedation may be temporary and may take some time for her to tolerate the medication  Has she have more of the cognitive difficulty and periods of memory loss? If she does not want to increase Fycompa, then we can try Onfi 5mg twice a day (reducing Fycompa back to 4mg daily)

## 2021-09-02 NOTE — TELEPHONE ENCOUNTER
Adán Erasto will approve for Perampanel 4mg tabs and discontinue 6mg tabs  We are still waiting for her lamotrigine level and VPA level; please find out why has she not completed her blood work  We need to know her lamotrigine level to determine if she is being underdosed as cause for more frequent seizures

## 2021-09-02 NOTE — TELEPHONE ENCOUNTER
Patient calling regarding medication  She is no longer alternating doses  Only on 4mg dose bc she has had extreme fatigue and tiredness  Has been on 4mg dose alone for about a week now, but needs new prescription  Patient is asking for prescription for 4mg tablets   No longer taking clonazepam      [address] [facility]

## 2021-09-13 DIAGNOSIS — G40.B19 INTRACTABLE JUVENILE MYOCLONIC EPILEPSY WITHOUT STATUS EPILEPTICUS (HCC): ICD-10-CM

## 2021-09-13 RX ORDER — DIVALPROEX SODIUM 500 MG/1
500 TABLET, EXTENDED RELEASE ORAL
Qty: 30 TABLET | Refills: 5 | Status: SHIPPED | OUTPATIENT
Start: 2021-09-13 | End: 2021-09-23 | Stop reason: SDUPTHER

## 2021-09-13 RX ORDER — LAMOTRIGINE 150 MG/1
TABLET ORAL
Qty: 60 TABLET | Refills: 5 | Status: SHIPPED | OUTPATIENT
Start: 2021-09-13 | End: 2021-09-23 | Stop reason: SDUPTHER

## 2021-09-13 NOTE — TELEPHONE ENCOUNTER
Patient calling to inform she will be cancelling appt for EEG  She has no transportation  She would like a referral to a new neurologist closer to her home  Advised patient to contact PCP for new neurology referral  Patient states she will no longer be following with our office  Requesting appointment for October be cancelled as well

## 2021-09-13 NOTE — TELEPHONE ENCOUNTER
Patient called back requesting enough refills of medication to get her to appt with new neurologist  She does not have new appt yet, agreeable to refill of lamictal and depakote until seen by new provider?

## 2021-09-13 NOTE — TELEPHONE ENCOUNTER
Patient agreeable to ANTIONE  Attempted to schedule with Dr Gresham, but schedule is not available past December and no OVL openings currently  Patient agreeable to repeat EEG  Please enter order and we can assist patient in scheduling  Arnol Crain - would you require OVL or OVS for patient? Marlen Jimenez - anything you can do in regards to schedule?

## 2021-09-13 NOTE — TELEPHONE ENCOUNTER
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 Testing will still be difficult for the patient if she has travel concerns  Ambulatory EEG study is recommended, or we repeat routine EEG studies  Instead of 48 hours ambulatory EEG study, I would recommend repeating a routine EEG study to assess for frequency of generalized discharges as a screening tool

## 2021-09-15 NOTE — TELEPHONE ENCOUNTER
Patient calling to check on status to scheduling appointment in [facility]  Saw that 30 minutes would be acceptable   Scheduled to see you 9/23 @ 11 AM

## 2021-09-23 ENCOUNTER — OFFICE VISIT (OUTPATIENT)
Dept: NEUROLOGY | Facility: CLINIC | Age: 41
End: 2021-09-23
Payer: COMMERCIAL

## 2021-09-23 DIAGNOSIS — G40.B19 INTRACTABLE JUVENILE MYOCLONIC EPILEPSY WITHOUT STATUS EPILEPTICUS (HCC): ICD-10-CM

## 2021-09-23 PROCEDURE — 99214 OFFICE O/P EST MOD 30 MIN: CPT | Performed by: PSYCHIATRY & NEUROLOGY

## 2021-09-23 RX ORDER — DIVALPROEX SODIUM 250 MG/1
250 TABLET, EXTENDED RELEASE ORAL
Qty: 30 TABLET | Refills: 1 | Status: SHIPPED | OUTPATIENT
Start: 2021-09-23 | End: 2021-10-19 | Stop reason: ALTCHOICE

## 2021-09-23 RX ORDER — PERAMPANEL 6 MG/1
6 TABLET ORAL
Qty: 30 TABLET | Refills: 5 | Status: SHIPPED | OUTPATIENT
Start: 2021-09-23 | End: 2021-11-08

## 2021-09-23 RX ORDER — LAMOTRIGINE 200 MG/1
TABLET ORAL
Qty: 60 TABLET | Refills: 11 | Status: SHIPPED | OUTPATIENT
Start: 2021-09-23 | End: 2021-10-19 | Stop reason: SDUPTHER

## 2021-09-24 ENCOUNTER — APPOINTMENT (OUTPATIENT)
Dept: LAB | Facility: CLINIC | Age: 41
End: 2021-09-24
Payer: COMMERCIAL

## 2021-09-24 LAB — VALPROATE SERPL-MCNC: 58 UG/ML (ref 50–100)

## 2021-09-24 PROCEDURE — 80165 DIPROPYLACETIC ACID FREE: CPT | Performed by: PSYCHIATRY & NEUROLOGY

## 2021-09-24 PROCEDURE — 36415 COLL VENOUS BLD VENIPUNCTURE: CPT | Performed by: PSYCHIATRY & NEUROLOGY

## 2021-09-24 PROCEDURE — 80164 ASSAY DIPROPYLACETIC ACD TOT: CPT | Performed by: PSYCHIATRY & NEUROLOGY

## 2021-09-24 PROCEDURE — 80339 ANTIEPILEPTICS NOS 1-3: CPT | Performed by: PSYCHIATRY & NEUROLOGY

## 2021-09-24 PROCEDURE — 80175 DRUG SCREEN QUAN LAMOTRIGINE: CPT | Performed by: PSYCHIATRY & NEUROLOGY

## 2021-09-27 LAB
LAMOTRIGINE SERPL-MCNC: 12.9 UG/ML (ref 2–20)
VALPROATE FREE SERPL-MCNC: 6.5 UG/ML (ref 6–22)

## 2021-10-06 LAB — MISCELLANEOUS LAB TEST RESULT: NORMAL

## 2021-10-18 ENCOUNTER — TELEPHONE (OUTPATIENT)
Dept: NEUROLOGY | Facility: CLINIC | Age: 41
End: 2021-10-18

## 2021-10-18 DIAGNOSIS — G40.B19 INTRACTABLE JUVENILE MYOCLONIC EPILEPSY WITHOUT STATUS EPILEPTICUS (HCC): ICD-10-CM

## 2021-10-19 RX ORDER — LAMOTRIGINE 200 MG/1
TABLET ORAL
Qty: 75 TABLET | Refills: 5 | Status: SHIPPED | OUTPATIENT
Start: 2021-10-19 | End: 2022-01-24 | Stop reason: SDUPTHER

## 2021-11-03 ENCOUNTER — TELEPHONE (OUTPATIENT)
Dept: NEUROLOGY | Facility: CLINIC | Age: 41
End: 2021-11-03

## 2021-11-05 ENCOUNTER — APPOINTMENT (OUTPATIENT)
Dept: LAB | Facility: CLINIC | Age: 41
End: 2021-11-05
Payer: COMMERCIAL

## 2021-11-08 ENCOUNTER — TELEMEDICINE (OUTPATIENT)
Dept: NEUROLOGY | Facility: CLINIC | Age: 41
End: 2021-11-08
Payer: COMMERCIAL

## 2021-11-08 DIAGNOSIS — G40.B19 INTRACTABLE JUVENILE MYOCLONIC EPILEPSY WITHOUT STATUS EPILEPTICUS (HCC): ICD-10-CM

## 2021-11-08 PROCEDURE — 99443 PR PHYS/QHP TELEPHONE EVALUATION 21-30 MIN: CPT | Performed by: PSYCHIATRY & NEUROLOGY

## 2021-11-08 RX ORDER — PERAMPANEL 8 MG/1
8 TABLET ORAL
Qty: 30 TABLET | Refills: 5 | Status: SHIPPED | OUTPATIENT
Start: 2021-11-08 | End: 2021-11-24

## 2021-11-24 ENCOUNTER — TELEPHONE (OUTPATIENT)
Dept: NEUROLOGY | Facility: CLINIC | Age: 41
End: 2021-11-24

## 2021-11-24 DIAGNOSIS — G40.B19 INTRACTABLE JUVENILE MYOCLONIC EPILEPSY WITHOUT STATUS EPILEPTICUS (HCC): Primary | ICD-10-CM

## 2021-11-24 RX ORDER — PERAMPANEL 6 MG/1
6 TABLET ORAL
Qty: 30 TABLET | Refills: 5 | Status: SHIPPED | OUTPATIENT
Start: 2021-11-24 | End: 2022-06-06

## 2021-11-24 NOTE — TELEPHONE ENCOUNTER
Patient of Dr Arun Duggan, last visit 11/8; recommendation to "Continue to take Lamotrigine 200 mg twice a day  -- Please increase the Fycompa (perampanel) to be 8 mg nightly"    received vm from patient advising she tried to increase dose of fycompa from 6 mg to 8 mg as recommended however had side effects of fatigue, weakness and decreased appetite; therefore she reduced dose back down to 6 mg bedtime    She is asking for refill of 6 mg dose be sent to [facility]; Please provide recommendation, thank you      Requesting  [phone]

## 2021-11-24 NOTE — TELEPHONE ENCOUNTER
Because she hasn't really tolerated higher lamotrigine or higher fycompa, we may need to consider a different medication  One option would be topiramate (Topamax)  If she is agreeable to a new medication, we can send prescription  I would recommend waiting a week or so to make sure side effects from higher fycompa resolve

## 2021-11-26 NOTE — TELEPHONE ENCOUNTER
Other potential options would be Lacosamide or Clobazam  I would likely try Clobazam which is a type of benzodiazepine, which does not affect the kidneys and does not have a risk of kidney stones  It also works differently from her current medications, so would be unlikely to cause side effects  If agreeable, I would recommend she start taking Clobazam 5 mg nightly for 1 week, then 5 mg twice a day for 1 week, then 5 mg in the am and 10 mg in the pm for 1 week, then 10 mg twice a day

## 2021-11-26 NOTE — TELEPHONE ENCOUNTER
Spoke to patient  At this point she states she is doing well and would rather NOT add any new medications  NOT agreeable to clobazam or lacosamide

## 2021-11-26 NOTE — TELEPHONE ENCOUNTER
Spoke to patient  She is not agreeable to starting topiramate due to kidney disease and the side effects associated with medication  (she is concerned with the risk of kidney stones), but open to other medications if other thoughts

## 2022-01-13 ENCOUNTER — TELEPHONE (OUTPATIENT)
Dept: NEUROLOGY | Facility: CLINIC | Age: 42
End: 2022-01-13

## 2022-01-13 DIAGNOSIS — G40.B19 INTRACTABLE JUVENILE MYOCLONIC EPILEPSY WITHOUT STATUS EPILEPTICUS (HCC): Primary | ICD-10-CM

## 2022-01-13 NOTE — TELEPHONE ENCOUNTER
Patient calling to report 5 seizures in 2 weeks  Describes as normal seizure  Full body shaking, biting of lip  she does not have additional details  Denies missed doses of medication, s/s of illness, difficulty sleeping  Patient states she is unable to complete virtual visits  She states she is also only able to be seen in [facility] office  Appt in march is scheduled as a virtual  Patient is requesting you call her instead  Advised we typically do not do telephone visits unless there are technical issues with virtual video visits for insurance reasons  Patient was not accepting of this  Would patient benefit from seeing Rowdy in [facility]? in between visits with you?     Medications confirmed:  lamictal 200mg BID  fycompa 6mg HS

## 2022-01-13 NOTE — TELEPHONE ENCOUNTER
She could see Rowdy if needed  Unfortunately, my availability there is rather limited  She is already on the highest tolerated doses of lamotrigine and fycompa  She previously did not want to try a different medication  Would she be willing to try a different medication? If so, the next best options would be either lacosamide or clobazam, as previously suggested

## 2022-01-14 RX ORDER — CLOBAZAM 10 MG/1
TABLET ORAL
Qty: 60 TABLET | Refills: 5 | Status: SHIPPED | OUTPATIENT
Start: 2022-01-14

## 2022-01-14 NOTE — TELEPHONE ENCOUNTER
Ok  I am sending Rx for Clobazam (Onfi) with plan to start half a tab (5 mg) twice a day for 1 week, then take 1 tab (10 mg) twice a day  This could potentially make her sleepy, but this is why we are going to increase up to a low dose to start       Sending Rx to pharmacy

## 2022-01-18 ENCOUNTER — TELEPHONE (OUTPATIENT)
Dept: NEUROLOGY | Facility: CLINIC | Age: 42
End: 2022-01-18

## 2022-01-18 NOTE — TELEPHONE ENCOUNTER
Spoke with patient, she expressed she is very concerned her seizures are becoming uncontrollable and cannot wait until her 5/12/22 appt  Offered patient appointment Monday 1/24/22 at 10am  Patient stated she will speak with her transportation to make arrangements she can most certainly make it to the appointment if it is at 12pm    Placed patient in 10am slot      Dr Kelley Givens are you agreeable to seeing patient at 12pm on Monday if transportation cannot make the 10am?

## 2022-01-24 ENCOUNTER — OFFICE VISIT (OUTPATIENT)
Dept: NEUROLOGY | Facility: CLINIC | Age: 42
End: 2022-01-24
Payer: COMMERCIAL

## 2022-01-24 VITALS
TEMPERATURE: 98 F | WEIGHT: 119.3 LBS | BODY MASS INDEX: 23.42 KG/M2 | HEIGHT: 60 IN | SYSTOLIC BLOOD PRESSURE: 115 MMHG | HEART RATE: 66 BPM | DIASTOLIC BLOOD PRESSURE: 78 MMHG

## 2022-01-24 DIAGNOSIS — G40.B19 INTRACTABLE JUVENILE MYOCLONIC EPILEPSY WITHOUT STATUS EPILEPTICUS (HCC): ICD-10-CM

## 2022-01-24 PROCEDURE — 99214 OFFICE O/P EST MOD 30 MIN: CPT | Performed by: PSYCHIATRY & NEUROLOGY

## 2022-01-24 RX ORDER — LAMOTRIGINE 200 MG/1
TABLET ORAL
Qty: 60 TABLET | Refills: 5 | Status: SHIPPED | OUTPATIENT
Start: 2022-01-24

## 2022-01-24 NOTE — ASSESSMENT & PLAN NOTE
She has continued to have generalized tonic clonic seizures  She denies any staring seizures, which she attributes to taking medical marijuana  I again discussed the importance of working with her medications to get her seizures under better control  She is on maximally tolerated doses of both lamotrigine and Fycompa, having not tolerated higher doses in the past  I risks/rationale and potential side effects of starting Clobazam  She is concerned about potential sleepiness, but after talking about the medication, she was willing to try starting it  She is planning to pick this up from her pharmacy later today  -- she will continue lamotrigine and fycompa unchanged  I will have her start Clobazam gradually by taking 5 mg nightly and increase by 5 mg weekly until at the goal dose of 10 mg twice a day  This can be increased further if she continues to have seizures

## 2022-01-24 NOTE — PROGRESS NOTES
Patient ID: Alyssa Silva is a 46 y o  female with intractable juvenile myoclonic epilepsy, bipolar disorder, migraine headaches, and prior tubal ligation, who is returning to Neurology office for follow up of her seizures  Assessment/Plan:    Intractable juvenile myoclonic epilepsy without status epilepticus ([facility] [address] )  She has continued to have generalized tonic clonic seizures  She denies any staring seizures, which she attributes to taking medical marijuana  I again discussed the importance of working with her medications to get her seizures under better control  She is on maximally tolerated doses of both lamotrigine and Fycompa, having not tolerated higher doses in the past  I risks/rationale and potential side effects of starting Clobazam  She is concerned about potential sleepiness, but after talking about the medication, she was willing to try starting it  She is planning to pick this up from her pharmacy later today  -- she will continue lamotrigine and fycompa unchanged  I will have her start Clobazam gradually by taking 5 mg nightly and increase by 5 mg weekly until at the goal dose of 10 mg twice a day  This can be increased further if she continues to have seizures  She will Return in about 3 months (around 4/24/2022)  Subjective:    HPI  Current seizure medications:  1  Fycompa 6 mg nightly   2  Lamotrigine 200 mg twice a day  Other medications as per Epic  Since her last visit, she has continued to have seizures, including a generalized tonic clonic seizure in December and had 3 back to back last week  The seizures last week each lasted a few minutes and occurred one after another  She denies any staring/"petit mal" or other seizures since her last appointment  She is concerned that she had three seizures, one after another, which is worse for her than it had been in the past      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   She also hasn't been using her medical marijuana as much as she had in the past  She feels the medical marijuana was really help    She had called into the office and was recommended to start taking Clobazam  She filled the Rx, but didn't start taking it  She was concerned about potential sleepiness from the medication, so never started it  Prior Seizure Medications: Zonisamide (ineffective), Levetiracetam (worsened seizures), Depakote (ineffective and side effects)         Objective:    Blood pressure 115/78, pulse 66, temperature 98 °F (36 7 °C), height 5' (1 524 m), weight 54 1 kg (119 lb 4 8 oz)  Physical Exam    Neurological Exam      ROS:    Review of Systems   Constitutional: Positive for appetite change (Less)  Negative for fever  HENT: Negative  Negative for hearing loss, tinnitus, trouble swallowing and voice change  Eyes: Negative  Negative for photophobia and pain  Respiratory: Negative  Negative for shortness of breath  Cardiovascular: Negative  Negative for palpitations  Gastrointestinal: Negative  Negative for nausea and vomiting  Endocrine: Negative  Negative for cold intolerance  Genitourinary: Negative  Negative for dysuria, frequency and urgency  Musculoskeletal: Negative  Negative for myalgias and neck pain  Skin: Negative  Negative for rash  Neurological: Positive for seizures  Negative for dizziness, tremors, syncope, facial asymmetry, speech difficulty, weakness, light-headedness, numbness and headaches  Hematological: Negative  Does not bruise/bleed easily  Psychiatric/Behavioral: Negative  Negative for confusion, hallucinations and sleep disturbance         I personally reviewed the ROS that was entered by the medical assistant

## 2022-01-24 NOTE — PATIENT INSTRUCTIONS
-- Start taking Clobazam (Onfi) half a tab (5 mg) nightly for 1 week, then take half (5 mg) twice a day for 1 week, then take half a tab (5 mg) in the morning and 1 tab (10 mg) at night for 1 week, then take 1 tab (10 mg) twice a day  -- Continue to take Lamotrigine 200 mg in the am and 300 mg at night and Fycompa 6 mg nightly  -- if you are still having seizures around 3/15/2022, please give our office a call and we may be able to increase the Clobazam a little further

## 2022-01-26 ENCOUNTER — TELEPHONE (OUTPATIENT)
Dept: NEUROLOGY | Facility: CLINIC | Age: 42
End: 2022-01-26

## 2022-01-26 NOTE — TELEPHONE ENCOUNTER
She should follow the instructions I gave in her AVS and in my note  Just to be clear, as follows:    Start taking Clobazam (Onfi) half a tab (5 mg) nightly for 1 week, then take half (5 mg) twice a day for 1 week, then take half a tab (5 mg) in the morning and 1 tab (10 mg) at night for 1 week, then take 1 tab (10 mg) twice a day

## 2022-01-26 NOTE — TELEPHONE ENCOUNTER
Pt calling in  Stated she picked up her [address] script yesterday and her prescription bottle has instructions that don't match up with how pt was instructed to take medication  Her bottle says: Take 5 mg BID for 1 week, then take 10 mg BID  Your office visit note says differently:     Start taking Clobazam (Onfi) half a tab (5 mg) nightly for 1 week, then take half (5 mg) twice a day for 1 week, then take half a tab (5 mg) in the morning and 1 tab (10 mg) at night for 1 week, then take 1 tab (10 mg) twice a day  So, pt is confused onwhat she should be following  Please clarify, Thank you! Pt asked for a CB# [phone] after 3 pm as she is going to work and doesn't have VM set up on her phone

## 2022-02-18 NOTE — TELEPHONE ENCOUNTER
Patient left message on nursing line  States she has increased to 5mg in am and 10mg in pm and is very sleepy at this dose  She is asking if this can be decreased back down to 5am and 5mg pm as she did not experience tiredness at that dose

## 2022-02-18 NOTE — TELEPHONE ENCOUNTER
She can decrease back to 5 mg bid, but I would recommend she try to re-increase after 2 weeks   This will give her body a little more time to adjust

## 2022-02-21 NOTE — TELEPHONE ENCOUNTER
Patient returned call  Informed of previous  Verbalized understanding  Nothing further at this time

## 2022-03-10 ENCOUNTER — TELEPHONE (OUTPATIENT)
Dept: NEUROLOGY | Facility: CLINIC | Age: 42
End: 2022-03-10

## 2022-03-10 NOTE — TELEPHONE ENCOUNTER
Patient left message on nursing line stating that she has stopped onfi  She felt medication was not helping  Still having multiple seizures, sometimes 2 per day  Patient states she doesn't want to be a "lab rat" and keep trying different medications, but wanted to make you aware she has stopped it

## 2022-03-11 NOTE — TELEPHONE ENCOUNTER
She was still having seizures frequently even before we started the [address]  I would be concerned that they would continue to occur if we don't try something else  Unfortunately, the only way to know if a medication is going to be helpful would be to start the medication and evaluate over time to see how it benefits her seizures  I agree that this is frustrating, but if we want to make the seizures better, this is the only way       If she is willing to try other medications, we can try another option

## 2022-06-03 DIAGNOSIS — G40.B19 INTRACTABLE JUVENILE MYOCLONIC EPILEPSY WITHOUT STATUS EPILEPTICUS (HCC): ICD-10-CM

## 2022-06-03 NOTE — TELEPHONE ENCOUNTER
Patient called and left  requesting refill of Fycompa 6 mg     She states she only has 1 pill for tonight     #: [phone]

## 2022-06-05 DIAGNOSIS — G40.B19 INTRACTABLE JUVENILE MYOCLONIC EPILEPSY WITHOUT STATUS EPILEPTICUS (HCC): ICD-10-CM

## 2022-06-06 RX ORDER — PERAMPANEL 6 MG/1
TABLET ORAL
Qty: 30 TABLET | Refills: 5 | Status: SHIPPED | OUTPATIENT
Start: 2022-06-06

## 2022-06-06 RX ORDER — PERAMPANEL 6 MG/1
6 TABLET ORAL
Qty: 30 TABLET | Refills: 5 | OUTPATIENT
Start: 2022-06-06

## 2022-08-12 DIAGNOSIS — G40.B19 INTRACTABLE JUVENILE MYOCLONIC EPILEPSY WITHOUT STATUS EPILEPTICUS (HCC): ICD-10-CM

## 2022-08-12 RX ORDER — LAMOTRIGINE 200 MG/1
TABLET ORAL
Qty: 60 TABLET | Refills: 5 | Status: SHIPPED | OUTPATIENT
Start: 2022-08-12

## 2022-08-12 NOTE — TELEPHONE ENCOUNTER
Spoke to patient and addressed her concerns regarding her medication refill  Patient is out of medication and is worried if she doesn't speak to someone today she will not get her medication in time  I let patient know we now have a new process for prescription refills  I also asked the patient to call us ahead of time so that she doesn't have to worry filling her prescription last minute      Patient requesting refill of    Lamictal 200mg   60day supply   Sent to [facility] in [facility]  Ph [phone]

## 2022-08-12 NOTE — TELEPHONE ENCOUNTER
Patient called in very irate demanding to speak to a live person to get her prescriptions refilled  I advised her that I can direct her to the nurses line and she can leave a detailed message and someone will get back to her as soon as they receive her message and she begin yelling give me a live person  I reached out to Denise Barnett who reached out to [employer] who said she will speak to the patient  Came back to the call and she hung up  And called back and was transferred to [facility]

## 2022-12-07 DIAGNOSIS — G40.B19 INTRACTABLE JUVENILE MYOCLONIC EPILEPSY WITHOUT STATUS EPILEPTICUS (HCC): ICD-10-CM

## 2022-12-08 RX ORDER — PERAMPANEL 6 MG/1
TABLET ORAL
Qty: 30 TABLET | Refills: 1 | Status: SHIPPED | OUTPATIENT
Start: 2022-12-08

## 2022-12-09 ENCOUNTER — TELEPHONE (OUTPATIENT)
Dept: NEUROLOGY | Facility: CLINIC | Age: 42
End: 2022-12-09

## 2022-12-09 NOTE — TELEPHONE ENCOUNTER
Pt left  requesting refill of fycompa  Refill was sent to pharm yesterday  Called pt and made her aware of above  Also scheduled pt for f/u appt  Pt only wanted to be seen in [address] [place] [address] office due to transportation  She is not able to do a virtual visit      Scheduled pt for 3/16/23

## 2022-12-09 NOTE — TELEPHONE ENCOUNTER
Patient called and stated she needs a refill on her Fycompa and please call her back to advise it is done

## 2023-02-06 DIAGNOSIS — G40.B19 INTRACTABLE JUVENILE MYOCLONIC EPILEPSY WITHOUT STATUS EPILEPTICUS (HCC): ICD-10-CM

## 2023-02-06 RX ORDER — PERAMPANEL 6 MG/1
1 TABLET ORAL
Qty: 30 TABLET | Refills: 1 | Status: SHIPPED | OUTPATIENT
Start: 2023-02-06

## 2023-02-06 RX ORDER — LAMOTRIGINE 200 MG/1
TABLET ORAL
Qty: 60 TABLET | Refills: 1 | Status: SHIPPED | OUTPATIENT
Start: 2023-02-06

## 2023-02-06 NOTE — TELEPHONE ENCOUNTER
Refill for Fycompa and lamotrigine approved; however, she has not been seen in the office for about a year; she has to keep her 3/16/2023 appointment with Dr Bragg Pretty to continue have refills

## 2023-02-06 NOTE — TELEPHONE ENCOUNTER
Patient called needs a refill of lamictal and fycompa sent to [facility]  Patient has no refills she needs the medication

## 2023-03-16 ENCOUNTER — OFFICE VISIT (OUTPATIENT)
Dept: NEUROLOGY | Facility: CLINIC | Age: 43
End: 2023-03-16

## 2023-03-16 VITALS
WEIGHT: 115 LBS | BODY MASS INDEX: 22.58 KG/M2 | HEIGHT: 60 IN | SYSTOLIC BLOOD PRESSURE: 120 MMHG | DIASTOLIC BLOOD PRESSURE: 82 MMHG

## 2023-03-16 DIAGNOSIS — G40.B19 INTRACTABLE JUVENILE MYOCLONIC EPILEPSY WITHOUT STATUS EPILEPTICUS (HCC): ICD-10-CM

## 2023-03-16 RX ORDER — PERAMPANEL 6 MG/1
1 TABLET ORAL
Qty: 30 TABLET | Refills: 5 | Status: SHIPPED | OUTPATIENT
Start: 2023-03-16

## 2023-03-16 RX ORDER — LAMOTRIGINE 200 MG/1
TABLET ORAL
Qty: 60 TABLET | Refills: 11 | Status: SHIPPED | OUTPATIENT
Start: 2023-03-16

## 2023-03-16 RX ORDER — LORATADINE 10 MG/1
10 TABLET ORAL DAILY
COMMUNITY

## 2023-03-16 NOTE — PATIENT INSTRUCTIONS
-- continue to take Lamotrigine and Fycompa unchanged       -- Please start taking Brivaracetam (Briviact) 50 mg twice a day

## 2023-03-16 NOTE — ASSESSMENT & PLAN NOTE
She continues to have frequent seizures with a generalized tonic clonic seizure about once a month on average  She is likely also having Absence seizures, but the exact frequency of these is not entirely clear  She historically has been very sensitive to treatments, including previously not tolerating VNS to where it had to be explanted  She did not tolerate recent addition of clobazam    After discussing potential options, I will have her start taking Brivaracetam 50 mg twice a day  --she will continue Fycompa and Lamotrigine unchanged for now, but if tolerating Brivaracetam well, then we can consider decreasing Fycompa    -- I stressed the importance of making sure she comes for regular follow ups given the frequency and severity of her seizures

## 2023-03-16 NOTE — PROGRESS NOTES
Patient ID: Natalie Gale is a 38 y o  female with intractable juvenile myoclonic epilepsy, bipolar disorder, migraine headaches, previously s/p VNS implantation and removal, and prior tubal ligation, who is returning to Neurology office for follow up of her seizures  Assessment/Plan:    Intractable juvenile myoclonic epilepsy without status epilepticus ([facility] [address] )  She continues to have frequent seizures with a generalized tonic clonic seizure about once a month on average  She is likely also having Absence seizures, but the exact frequency of these is not entirely clear  She historically has been very sensitive to treatments, including previously not tolerating VNS to where it had to be explanted  She did not tolerate recent addition of clobazam    After discussing potential options, I will have her start taking Brivaracetam 50 mg twice a day  --she will continue Fycompa and Lamotrigine unchanged for now, but if tolerating Brivaracetam well, then we can consider decreasing Fycompa    -- I stressed the importance of making sure she comes for regular follow ups given the frequency and severity of her seizures  She will Return in about 3 months (around 6/16/2023)  Subjective:    HPI  Current seizure medications:  1  Lamotrigine 200 mg twice a day  2  Perampanel 6 mg nightly  Other medications as per Epic  Since her last visit, she started the clobazam, but when getting to 5 mg in the morning and 10 mg at night, she was excessively sleepy  She decreased it back down to 5 mg twice a day, but then stopped it  She unfortunately was lost to follow up, missing 2 schedule appointments since that time  Shortly after her last visit, she was having a GTC about every week, but after a few months, they decreased to where she is currently only having seizures right after her menstrual period  Her period is still happening and is still on schedule each month       She is pretty sure that her most recent seizure was a few days ago  This was a generalized tonic clonic seizures  She will still have events where she will forget what she is doing  She thinks these may be "petite mal" seizures (Absence seizures), but she is not sure of their exact frequency  She denies any side effects to her current doses of medications, but previously didn't tolerate higher doses of Lamotrigine or perampanel  Prior Seizure Medications: Zonisamide (ineffective), Levetiracetam (worsened seizures), Depakote (ineffective and side effects), clobazam (sleepiness at low dose)      Objective:    Blood pressure 120/82, height 5' (1 524 m), weight 52 2 kg (115 lb)  Physical Exam    Neurological Exam      ROS:    Review of Systems   Constitutional: Negative  Negative for appetite change and fever  HENT: Negative  Negative for hearing loss, tinnitus, trouble swallowing and voice change  Eyes: Negative  Negative for photophobia, pain and visual disturbance  Respiratory: Negative  Negative for shortness of breath  Cardiovascular: Negative  Negative for palpitations  Gastrointestinal: Negative  Negative for nausea and vomiting  Endocrine: Negative  Negative for cold intolerance  Genitourinary: Negative  Negative for dysuria, frequency and urgency  Musculoskeletal: Negative  Negative for gait problem, myalgias and neck pain  Skin: Negative  Negative for rash  Allergic/Immunologic: Negative  Neurological: Positive for seizures (around menstrual cycle patient has sz)  Negative for dizziness, tremors, syncope, facial asymmetry, speech difficulty, weakness, light-headedness, numbness and headaches  Hematological: Negative  Does not bruise/bleed easily  Psychiatric/Behavioral: Negative  Negative for confusion, hallucinations and sleep disturbance  All other systems reviewed and are negative        I personally reviewed the ROS that was entered by the medical assistant    Voice recognition software was used in the generation of this note  There may be unintentional errors including grammatical errors, spelling errors, or pronoun errors

## 2023-03-21 ENCOUNTER — TELEPHONE (OUTPATIENT)
Dept: NEUROLOGY | Facility: CLINIC | Age: 43
End: 2023-03-21

## 2023-03-21 NOTE — TELEPHONE ENCOUNTER
Received VM transcription:    Hi, yes, my name is April Marlo  I'm a patient with Dr Juan Duran  I just saw Dr Juan Duran on the 16th, and he wanted me to start taking a new medication for the seizures  Apparently he sent that to the pharmacy  I went to the pharmacy to pick it up  And they're telling me that there's something wrong with the insurance or not going through or something  If somebody could give me a call back as soon as possible that'd be great  The telephone number that you can reach me at is [phone]  I'm just gonna be able to be contacted after 4 o'clock today  I would like somebody to give me a call back as soon as possible  Thank you   --------------------------------------------------------    Spoke with pt and she says the pharmacy is telling her the medication Briviact needs authorization from the insurance  She says pharmacy said they notified Dr Gresham's office  Advised pt that her clinical team would be notified of PA requirement and when we receive determination, we will contact her  Pt appreciative of call back  Best  [phone],  not set up

## 2023-03-21 NOTE — TELEPHONE ENCOUNTER
ID: [id number]  GROUP: [id number]  PCN: [id number]  BIN: [id number]    [place]: [id number]    PA submitted, awaiting determination

## 2023-03-23 NOTE — TELEPHONE ENCOUNTER
Patient called she said she was returning a call  Informed her it was from the nurse  She said she knows that already and disconnected the call

## 2023-03-24 ENCOUNTER — TELEPHONE (OUTPATIENT)
Dept: NEUROLOGY | Facility: CLINIC | Age: 43
End: 2023-03-24

## 2023-03-24 NOTE — TELEPHONE ENCOUNTER
Notified patient of previous  Will continue with medication trial and let office know if anything else is needed

## 2023-03-24 NOTE — TELEPHONE ENCOUNTER
Brivaracetam (Briviact) is a more selective molecule, meaning it works the same way, but is a smaller structure  In general, it works similar to Levetiracetam, but doesn't have the same side effects  Even though it is similar, it is different enough that I would not expect the same reaction as when she took Levetiracetam  In general, we use Brivaracetam when people have had bad reactions with Levetiracetam, since being more selective of a molecule, it mainly interacts just in the part of the neuron that is needed to help with seizures  It is very different from Rheba Dione (clobazam), which works at a completely different recepter  I would not expect any similarities to Onfi since they are very different

## 2023-03-24 NOTE — TELEPHONE ENCOUNTER
Call returned to patient  Picked up briviact  Was told by pharmacy, briviact is similar to levetiracetam  She noted that she has "allergy" to keppra - more seizures and more intense seizures  Patient states she read instructions and feels medication is similar to [facility] which she had increased weakness, nausea and mood issues  Very concerned with medication side effects  Advised patient unfortunately many seizure medications have similar side effects and we do not know how patients will respond to medications until tried  Are chemicals similar to levetiracetam? Patient is concerned she would not tolerate  Requesting your input  Patient willing to start medication Saturday night as she has off Sunday and Monday  But would still like to know how similar levetiracetam and briviact are

## 2023-03-24 NOTE — TELEPHONE ENCOUNTER
Patient calling to get a call from the clinical team regarding her medication before the end of the day    Please assist

## 2023-03-27 ENCOUNTER — NURSE TRIAGE (OUTPATIENT)
Dept: OTHER | Facility: OTHER | Age: 43
End: 2023-03-27

## 2023-03-27 NOTE — TELEPHONE ENCOUNTER
"Regarding: Medication Question  ----- Message from Fallon Stubbs sent at 3/27/2023  6:22 PM EDT -----  \"I started taking a new medication, BRIVIACT, on Saturday  I have a few questions about medical marijuana and taking Excedrin while on this medication  \"    "

## 2023-03-27 NOTE — TELEPHONE ENCOUNTER
"  Reason for Disposition  • [1] Caller has NON-URGENT medicine question about med that PCP prescribed AND [2] triager unable to answer question    Answer Assessment - Initial Assessment Questions  1  NAME of MEDICATION: \"What medicine are you calling about? \"      BRIVIACT  2  QUESTION: \"What is your question? \" (e g , medication refill, side effect)      Can I take this with medical marijuana I did last night and I just slept for 14 hours and can I take Excedrin? 3  PRESCRIBING HCP: \"Who prescribed it? \" Reason: if prescribed by specialist, call should be referred to that group        Daisy Maharaj    Protocols used: MEDICATION QUESTION CALL-ADULT-    "

## 2023-07-25 NOTE — PROGRESS NOTES
Patient ID: Katerine Daniels is a 41 y.o. female with intractable juvenile myoclonic epilepsy, bipolar disorder, migraine headaches, previously s/p VNS implantation and removal, and prior tubal ligation, who is returning to Neurology office for follow up of her seizures. Assessment/Plan:    Intractable juvenile myoclonic epilepsy without status epilepticus ([address])  She has not had any additional seizures since her last appointment and since starting brivaracetam.  She is tolerating her medications well currently without any significant side effects. She is still on a rather low dose of brivaracetam, so if she would continue have seizures, this dose could be increased. --She will continue lamotrigine, perampanel and brivaracetam unchanged. If she would have additional seizures, her dose of brivaracetam could be increased. She will Return in about 6 months (around 1/26/2024). Subjective:    HPI  Current seizure medications:  1. Lamotrigine 200 mg twice a day  2. Perampanel 6 mg daily-pm  3. Brivaracetam 50 mg twice a day  Other medications as per [facility]. Since her last visit, she did start Brivaracetam. Initially, she had some dizziness and was a little off balance. This lasted for about the first month, but then essentially went away. If she is really active, this may be more of a problem, but is very intermittent at this point. She has not had any seizures since starting Brivaracetam. Overall, she is very happy with how she is doing. Prior Seizure Medications: Zonisamide (ineffective), Levetiracetam (worsened seizures), Depakote (ineffective and side effects), clobazam (sleepiness at low dose)       Objective:    Blood pressure 120/80, height 5' (1.524 m), weight 50.8 kg (112 lb). Physical Exam    Neurological Exam      ROS:    Review of Systems   Constitutional: Negative for appetite change, fatigue and fever. HENT: Negative.   Negative for hearing loss, tinnitus, trouble swallowing and voice change. Eyes: Negative. Negative for photophobia, pain and visual disturbance. Respiratory: Negative. Negative for shortness of breath. Cardiovascular: Negative. Negative for palpitations. Gastrointestinal: Negative. Negative for nausea and vomiting. Endocrine: Negative. Negative for cold intolerance. Genitourinary: Negative. Negative for dysuria, frequency and urgency. Musculoskeletal: Negative for back pain, gait problem, myalgias and neck pain. Skin: Negative. Negative for rash. Allergic/Immunologic: Negative. Neurological: Negative. Negative for dizziness, tremors, seizures, syncope, facial asymmetry, speech difficulty, weakness, light-headedness, numbness and headaches. Hematological: Negative. Does not bruise/bleed easily. Psychiatric/Behavioral: Negative. Negative for confusion, hallucinations and sleep disturbance. All other systems reviewed and are negative. I personally reviewed the ROS that was entered by the medical assistant    Voice recognition software was used in the generation of this note. There may be unintentional errors including grammatical errors, spelling errors, or pronoun errors.

## 2023-07-26 ENCOUNTER — OFFICE VISIT (OUTPATIENT)
Dept: NEUROLOGY | Facility: CLINIC | Age: 43
End: 2023-07-26
Payer: COMMERCIAL

## 2023-07-26 VITALS
DIASTOLIC BLOOD PRESSURE: 80 MMHG | BODY MASS INDEX: 21.99 KG/M2 | SYSTOLIC BLOOD PRESSURE: 120 MMHG | HEIGHT: 60 IN | WEIGHT: 112 LBS

## 2023-07-26 DIAGNOSIS — G40.B19 INTRACTABLE JUVENILE MYOCLONIC EPILEPSY WITHOUT STATUS EPILEPTICUS (HCC): ICD-10-CM

## 2023-07-26 PROCEDURE — 99213 OFFICE O/P EST LOW 20 MIN: CPT | Performed by: PSYCHIATRY & NEUROLOGY

## 2023-07-26 RX ORDER — PERAMPANEL 6 MG/1
1 TABLET ORAL
Qty: 30 TABLET | Refills: 5 | Status: SHIPPED | OUTPATIENT
Start: 2023-07-26

## 2023-07-26 NOTE — PATIENT INSTRUCTIONS
-- continue to take your seizure medications unchanged.      -- if you have any additional seizures, please call our office, and we could increase your Brivaracetam.

## 2023-07-31 NOTE — ASSESSMENT & PLAN NOTE
She has not had any additional seizures since her last appointment and since starting brivaracetam.  She is tolerating her medications well currently without any significant side effects. She is still on a rather low dose of brivaracetam, so if she would continue have seizures, this dose could be increased. --She will continue lamotrigine, perampanel and brivaracetam unchanged. If she would have additional seizures, her dose of brivaracetam could be increased.

## 2023-10-18 ENCOUNTER — TELEPHONE (OUTPATIENT)
Dept: NEUROLOGY | Facility: CLINIC | Age: 43
End: 2023-10-18

## 2023-10-18 NOTE — TELEPHONE ENCOUNTER
Patient called to schedule 3 month follow up from March. Patient was last seen in July but she is insisting that she was not seen. I told she was here but she is saying she wasn't around at this time. Please call patient that she needs to schedule 6 month f/u. Canceled appointment given 10/25 with Dr Marc Cervantes.

## 2023-10-19 ENCOUNTER — TELEPHONE (OUTPATIENT)
Dept: NEUROLOGY | Facility: CLINIC | Age: 43
End: 2023-10-19

## 2024-01-23 DIAGNOSIS — G40.B19 INTRACTABLE JUVENILE MYOCLONIC EPILEPSY WITHOUT STATUS EPILEPTICUS (HCC): ICD-10-CM

## 2024-01-24 RX ORDER — PERAMPANEL 6 MG/1
1 TABLET ORAL
Qty: 30 TABLET | Refills: 5 | Status: SHIPPED | OUTPATIENT
Start: 2024-01-24

## 2024-01-24 RX ORDER — BRIVARACETAM 50 MG/1
50 TABLET, FILM COATED ORAL 2 TIMES DAILY
Qty: 60 TABLET | Refills: 5 | Status: SHIPPED | OUTPATIENT
Start: 2024-01-24

## 2024-03-29 DIAGNOSIS — G40.B19 INTRACTABLE JUVENILE MYOCLONIC EPILEPSY WITHOUT STATUS EPILEPTICUS (HCC): ICD-10-CM

## 2024-03-29 RX ORDER — LAMOTRIGINE 200 MG/1
TABLET ORAL
Qty: 60 TABLET | Refills: 3 | Status: SHIPPED | OUTPATIENT
Start: 2024-03-29

## 2024-05-16 ENCOUNTER — TELEPHONE (OUTPATIENT)
Dept: NEUROLOGY | Facility: CLINIC | Age: 44
End: 2024-05-16

## 2024-05-16 NOTE — TELEPHONE ENCOUNTER
received  5/16 at 2:33pm-I yes, my name is Eliane carter, telephone number that you can reach me at is [phone]. I'm a patient with Dr. Gresham. just recently I injured my arm and my primary care doctor thinks, so I pinched nerve and I'm doing all kinds of test for my arm. And they put me on a 5 milligram muscle relaxer and then 200 milligram ibuprofen Uh, I took the muscle relaxers. And at one point after the medicine being in my system I, I ended up having a seizure and became very violent uh after the seizure. And uh, just recently, this morning I took one yesterday, just one of the muscle relaxers. And I had a lot of caffeine and chocolate last night and I end up having 2 seizures this morning. And I was just wondering if somebody could get back to me to see if maybe the muscle relaxers are interacting with my seizure medication. And along with the caffeine,  that would be great if somebody could get back to me as soon as possible. Thank you.

## 2024-05-29 ENCOUNTER — PROCEDURE VISIT (OUTPATIENT)
Dept: NEUROLOGY | Facility: CLINIC | Age: 44
End: 2024-05-29
Payer: COMMERCIAL

## 2024-05-29 DIAGNOSIS — G56.03 BILATERAL CARPAL TUNNEL SYNDROME: ICD-10-CM

## 2024-05-29 PROCEDURE — 95886 MUSC TEST DONE W/N TEST COMP: CPT | Performed by: PHYSICAL MEDICINE & REHABILITATION

## 2024-05-29 PROCEDURE — 95911 NRV CNDJ TEST 9-10 STUDIES: CPT | Performed by: PHYSICAL MEDICINE & REHABILITATION

## 2024-06-13 ENCOUNTER — TELEPHONE (OUTPATIENT)
Dept: NEUROLOGY | Facility: CLINIC | Age: 44
End: 2024-06-13

## 2024-06-13 NOTE — TELEPHONE ENCOUNTER
Patient has called in requesting the EMG Resullts to be faxed as soon as possible to Dr. Lang Cortez at [facility] , Fax # [phone]. Patient is scheduled to see  provider today and will like for them to receive these right away.

## 2024-07-25 DIAGNOSIS — G40.B19 INTRACTABLE JUVENILE MYOCLONIC EPILEPSY WITHOUT STATUS EPILEPTICUS (HCC): ICD-10-CM

## 2024-07-25 RX ORDER — LAMOTRIGINE 200 MG/1
TABLET ORAL
Qty: 60 TABLET | Refills: 5 | Status: CANCELLED | OUTPATIENT
Start: 2024-07-25

## 2024-07-25 RX ORDER — BRIVARACETAM 50 MG/1
50 TABLET, FILM COATED ORAL 2 TIMES DAILY
Qty: 60 TABLET | Refills: 5 | Status: CANCELLED | OUTPATIENT
Start: 2024-07-25

## 2024-07-25 NOTE — TELEPHONE ENCOUNTER
Reason for call:   [x] Refill   [] Prior Auth  [] Other:     Office:   [] PCP/Provider -   [x] Specialty/Provider - neuro    Medication:       Does the patient have enough for 3 days?   [] Yes   [x] No - Send as HP to POD

## 2024-07-27 ENCOUNTER — NURSE TRIAGE (OUTPATIENT)
Dept: OTHER | Facility: OTHER | Age: 44
End: 2024-07-27

## 2024-07-27 DIAGNOSIS — G40.B19 INTRACTABLE JUVENILE MYOCLONIC EPILEPSY WITHOUT STATUS EPILEPTICUS (HCC): Primary | ICD-10-CM

## 2024-07-27 DIAGNOSIS — G40.B19 INTRACTABLE JUVENILE MYOCLONIC EPILEPSY WITHOUT STATUS EPILEPTICUS (HCC): ICD-10-CM

## 2024-07-27 NOTE — TELEPHONE ENCOUNTER
On call provider paged. Rx sent to [facility]. If having seizures, should proceed to emergency room for evaluation. Pt unavailable. Detailed voicemail left.

## 2024-07-27 NOTE — TELEPHONE ENCOUNTER
"Regarding: medications/lamtrogrine/fycompa/brivact  ----- Message from Val AGUILAR sent at 7/27/2024  9:23 AM EDT -----  Pt stated \" I need a refill on my brivact, lamatrol and fycompa. I have been calling for three days straight and it was suppose to be sent but it wasn't\"    Pt is extremely agitated and is insisting on getting her refills    "

## 2024-07-27 NOTE — TELEPHONE ENCOUNTER
"Reason for Disposition  • [1] Prescription refill request for ESSENTIAL medicine (i.e., likelihood of harm to patient if not taken) AND [2] triager unable to refill per department policy    Answer Assessment - Initial Assessment Questions  1. DRUG NAME: \"What medicine do you need to have refilled?\"      Lamictal - has a couple doses left      Brivact - ran out beginning of last week      Fycompa - out since last week    2. REFILLS REMAINING: \"How many refills are remaining?\" (Note: The label on the medicine or pill bottle will show how many refills are remaining. If there are no refills remaining, then a renewal may be needed.)      0    3. EXPIRATION DATE: \"What is the expiration date?\" (Note: The label states when the prescription will , and thus can no longer be refilled.)      N/A    4. PRESCRIBING HCP: \"Who prescribed it?\" Reason: If prescribed by specialist, call should be referred to that group.      Dr. Gresham     5. SYMPTOMS: \"Do you have any symptoms?\"      Seizures - Last seizure 0730 this morning.    Protocols used: Medication Refill and Renewal Call-ADULT-    "

## 2024-07-29 RX ORDER — BRIVARACETAM 50 MG/1
50 TABLET, FILM COATED ORAL 2 TIMES DAILY
Qty: 60 TABLET | Refills: 6 | Status: SHIPPED | OUTPATIENT
Start: 2024-07-29

## 2024-07-29 RX ORDER — PERAMPANEL 6 MG/1
1 TABLET ORAL
Qty: 30 TABLET | Refills: 5 | Status: SHIPPED | OUTPATIENT
Start: 2024-07-29

## 2024-07-29 RX ORDER — LAMOTRIGINE 200 MG/1
TABLET ORAL
Qty: 60 TABLET | Refills: 6 | Status: SHIPPED | OUTPATIENT
Start: 2024-07-29

## 2024-07-29 NOTE — TELEPHONE ENCOUNTER
Last seen by Dr Gresham on 7/26/23  Per office notes-Return in about 6 months (around 1/26/2024).     PEPS, pls schedule a f/u appt    Thanks    Dr Gresham,   Rxs entered. Pls review and sign off if agreeable    Medication: lamotrigine and briviact    Dose/Frequency: 200 mg and 50 mg    Quantity: 60 and 60    Pharmacy: [facility]    Office:   [] PCP/Provider -   [x] Speciality/Provider -     Does the patient have enough for 3 days?   [] Yes   [] No - Send as HP to POD

## 2024-08-05 NOTE — TELEPHONE ENCOUNTER
Called patient to schedule with Dr Gresham for continuation of RX refills. No open availabilities as of now added patient to wait list.  Patient has refills to hold over for the next month or so, but is concerned that she may run out of medication before she is able to be seen by Dr Gresham.    Patient is upset because she stated that the last time she saw Dr Gresham she was suppose to get a call to get her scheduled and she never received a call.    Is there any way that patient could possibly see AP?  Please advise and assist

## 2024-09-17 ENCOUNTER — TELEPHONE (OUTPATIENT)
Dept: NEUROLOGY | Facility: CLINIC | Age: 44
End: 2024-09-17

## 2024-09-17 NOTE — TELEPHONE ENCOUNTER
Called pt to offer a 1 hr OVL appt with Bethany Prieto.  Pt started screaming at me about transportation and me not knowing what the appt will be about for a whole hour.  I tried to explain the notes we had but she wouldn't allow me to speak.  She started to be derogatory and hung up on me afterwards.

## 2024-09-25 DIAGNOSIS — G40.B19 INTRACTABLE JUVENILE MYOCLONIC EPILEPSY WITHOUT STATUS EPILEPTICUS (HCC): ICD-10-CM

## 2024-09-26 ENCOUNTER — TELEPHONE (OUTPATIENT)
Age: 44
End: 2024-09-26

## 2024-09-26 RX ORDER — BRIVARACETAM 50 MG/1
50 TABLET, FILM COATED ORAL 2 TIMES DAILY
Qty: 60 TABLET | Refills: 0 | Status: SHIPPED | OUTPATIENT
Start: 2024-09-26

## 2024-09-26 RX ORDER — LAMOTRIGINE 200 MG/1
TABLET ORAL
Qty: 60 TABLET | Refills: 0 | Status: SHIPPED | OUTPATIENT
Start: 2024-09-26

## 2024-09-26 NOTE — TELEPHONE ENCOUNTER
Pt called in stating she has been waiting months to schedule with epileptologist taking over for Dr. Gresham's [facility] patients. Pt refuses to go to any of our other offices, Dr. Marmolejo ok to schedule ANTIONE with you ?

## 2024-09-26 NOTE — TELEPHONE ENCOUNTER
Medication: Briviact 50 MG TABS tablet     Dose/Frequency: 50 mg    Quantity: 60 tablet    Pharmacy: [facility] # [address] [place] [address]     Office:   [] PCP/Provider -   [x] Speciality/Provider -     Does the patient have enough for 3 days?   [] Yes   [x] No - Send as HP to POD         Medication:   lamoTRIgine (LaMICtal) 200 MG tablet     Dose/Frequency: as directed       Quantity: 60 tablet    Pharmacy: [facility] # [address] [place] [address]     Office:   [] PCP/Provider -   [x] Speciality/Provider -     Does the patient have enough for 3 days?   [] Yes   [x] No - Send as HP to POD

## 2024-09-26 NOTE — TELEPHONE ENCOUNTER
Briviact and lamotrigine refills sent to pharm today     Called pt and made her aware of above and to contact pharm to have these filled

## 2024-09-26 NOTE — TELEPHONE ENCOUNTER
Pharmacy called to check on refill request and states patient is completely out of medication. Please review and refill as soon as possible.

## 2024-09-27 NOTE — TELEPHONE ENCOUNTER
Warm transfer from Neurology PEP.     Pt wanted to double check on her medications. Stated that pt only had a 1 month supply sent to the pharmacy but was informed that the refills would be taken care of until pt is seen with Dr. Marmolejo in January.    RN reviewed with pt that the medication needs to be requested monthly and to call into the office to request the medication refill. Pt verbalized understanding.

## 2024-10-21 DIAGNOSIS — G40.B19 INTRACTABLE JUVENILE MYOCLONIC EPILEPSY WITHOUT STATUS EPILEPTICUS (HCC): ICD-10-CM

## 2024-10-21 RX ORDER — LAMOTRIGINE 200 MG/1
TABLET ORAL
Qty: 60 TABLET | Refills: 0 | Status: SHIPPED | OUTPATIENT
Start: 2024-10-21

## 2024-10-23 DIAGNOSIS — G40.B19 INTRACTABLE JUVENILE MYOCLONIC EPILEPSY WITHOUT STATUS EPILEPTICUS (HCC): ICD-10-CM

## 2024-10-23 RX ORDER — BRIVARACETAM 50 MG/1
50 TABLET, FILM COATED ORAL 2 TIMES DAILY
Qty: 60 TABLET | Refills: 3 | Status: SHIPPED | OUTPATIENT
Start: 2024-10-23

## 2024-10-23 NOTE — TELEPHONE ENCOUNTER
Reason for call:   [x] Refill   [] Prior Auth  [] Other:     Office:   [] PCP/Provider -   [x] Specialty/Provider - NEURO /Charis Marmolejo MD      Medication:Briviact 50 MG TABS tablet       Dose/Frequency: TAKE ONE TABLET BY MOUTH TWICE DAILY,    Quantity: 60    Pharmacy: [facility] [id number]     Does the patient have enough for 3 days?   [] Yes   [x] No - Send as HP to POD

## 2024-11-27 DIAGNOSIS — G40.B19 INTRACTABLE JUVENILE MYOCLONIC EPILEPSY WITHOUT STATUS EPILEPTICUS (HCC): ICD-10-CM

## 2024-11-27 RX ORDER — LAMOTRIGINE 200 MG/1
TABLET ORAL
Qty: 60 TABLET | Refills: 0 | Status: SHIPPED | OUTPATIENT
Start: 2024-11-27

## 2024-11-27 RX ORDER — BRIVARACETAM 50 MG/1
50 TABLET, FILM COATED ORAL 2 TIMES DAILY
Qty: 60 TABLET | Refills: 3 | Status: SHIPPED | OUTPATIENT
Start: 2024-11-27

## 2024-12-26 ENCOUNTER — TELEPHONE (OUTPATIENT)
Dept: NEUROLOGY | Facility: CLINIC | Age: 44
End: 2024-12-26

## 2024-12-26 NOTE — TELEPHONE ENCOUNTER
Called back pt per her request. Pt wanted a medication refill for lamoTRIgine (LaMICtal) 200 MG tablet. Pt was upset and stated she is on her last day of medication and that it was crucial she get this med refilled to prevent a seizure. She asked why the  Could not fill the order today and I stated she was out sick and would not be able to refill today. I told pt we would put in a request for high priority, but could not guaranteed the refill would be done before the weekend. I advised pt to call us when she has about 7 days left of her medication so we have time to fulfil the refill before the medication runs out.

## 2024-12-26 NOTE — TELEPHONE ENCOUNTER
Patient called in for refill of LAMOTRIGINE 200 MG.  She has 1 pill for tomorrow left.  Patient uses [id number]  on [address] [place] [address]

## 2024-12-27 ENCOUNTER — TELEPHONE (OUTPATIENT)
Dept: NEUROLOGY | Facility: CLINIC | Age: 44
End: 2024-12-27

## 2024-12-27 DIAGNOSIS — G40.B19 INTRACTABLE JUVENILE MYOCLONIC EPILEPSY WITHOUT STATUS EPILEPTICUS (HCC): ICD-10-CM

## 2024-12-27 RX ORDER — LAMOTRIGINE 200 MG/1
200 TABLET ORAL 2 TIMES DAILY
Qty: 60 TABLET | Refills: 2 | Status: SHIPPED | OUTPATIENT
Start: 2024-12-27 | End: 2025-03-27

## 2024-12-27 NOTE — TELEPHONE ENCOUNTER
Would like a refil on her seizure med     lamoTRIgine (LaMICtal) 200 MG tablet   Pt states today is her last day.

## 2025-01-17 ENCOUNTER — TELEPHONE (OUTPATIENT)
Age: 45
End: 2025-01-17

## 2025-01-17 NOTE — TELEPHONE ENCOUNTER
Dr. Marmolejo,   Did you want to order any labs for patient to complete prior to her upcoming appointment with you on 1-28-25?    Please advise. Thank you!

## 2025-01-17 NOTE — TELEPHONE ENCOUNTER
Pt said that she got a missed call from [facility]. Pt called in asking if there was a new order put in?     I checked and could find anything from Dr. Marmolejo.     Pt wanted to now if there was a testing she needed prior to the appt ?     Pt is scheduled 1/28/25 at 9:30 am with Dr. Jaleel Bass office     Please advise and call pt if anything is needed prior to the appt.     Thank you.

## 2025-01-20 NOTE — TELEPHONE ENCOUNTER
Phone call to patient regarding 's message below. Advised same. Patient voiced clear understanding.

## 2025-02-03 ENCOUNTER — TELEPHONE (OUTPATIENT)
Dept: NEUROLOGY | Facility: CLINIC | Age: 45
End: 2025-02-03

## 2025-02-03 DIAGNOSIS — G40.B19 INTRACTABLE JUVENILE MYOCLONIC EPILEPSY WITHOUT STATUS EPILEPTICUS (HCC): ICD-10-CM

## 2025-02-03 RX ORDER — PERAMPANEL 6 MG/1
1 TABLET ORAL
Qty: 30 TABLET | Refills: 5 | Status: SHIPPED | OUTPATIENT
Start: 2025-02-03

## 2025-02-03 NOTE — TELEPHONE ENCOUNTER
Patient called for refill of FYCOMPA 6 mg to be called into [facility] on [address] in [facility]. She is completely out and had a grand mal seizure yesterday. She is scheduled for soonest available, but is not til 4/28/25.

## 2025-03-24 ENCOUNTER — TELEPHONE (OUTPATIENT)
Dept: NEUROLOGY | Facility: CLINIC | Age: 45
End: 2025-03-24

## 2025-03-24 DIAGNOSIS — G40.B19 INTRACTABLE JUVENILE MYOCLONIC EPILEPSY WITHOUT STATUS EPILEPTICUS (HCC): ICD-10-CM

## 2025-03-24 RX ORDER — LAMOTRIGINE 200 MG/1
200 TABLET ORAL 2 TIMES DAILY
Qty: 60 TABLET | Refills: 2 | Status: SHIPPED | OUTPATIENT
Start: 2025-03-24 | End: 2025-06-22

## 2025-03-24 RX ORDER — BRIVARACETAM 50 MG/1
50 TABLET, FILM COATED ORAL 2 TIMES DAILY
Qty: 60 TABLET | Refills: 1 | Status: SHIPPED | OUTPATIENT
Start: 2025-03-24

## 2025-03-24 NOTE — TELEPHONE ENCOUNTER
Would like her meds refilled please  Briviact 50 mg  Lacictal 200mg  [facility], [place]

## 2025-05-27 ENCOUNTER — TELEPHONE (OUTPATIENT)
Dept: NEUROLOGY | Facility: CLINIC | Age: 45
End: 2025-05-27

## 2025-05-27 NOTE — TELEPHONE ENCOUNTER
Patient called to schedule appointment with Dr. Marmolejo for 9/24/25 at 2 PM.  Patient requested to to have refills on Briviact 50 mg tab one b.I.d. ,  LaMICtal 200 mg tab one b.I.d.,  and Fycompa 6 mg tab one hs.

## 2025-06-02 ENCOUNTER — TELEPHONE (OUTPATIENT)
Dept: NEUROLOGY | Facility: CLINIC | Age: 45
End: 2025-06-02

## 2025-06-02 DIAGNOSIS — G40.B19 INTRACTABLE JUVENILE MYOCLONIC EPILEPSY WITHOUT STATUS EPILEPTICUS (HCC): ICD-10-CM

## 2025-06-02 RX ORDER — LAMOTRIGINE 200 MG/1
200 TABLET ORAL 2 TIMES DAILY
Qty: 60 TABLET | Refills: 4 | Status: SHIPPED | OUTPATIENT
Start: 2025-06-02 | End: 2025-08-31

## 2025-06-02 RX ORDER — BRIVARACETAM 50 MG/1
50 TABLET, FILM COATED ORAL 2 TIMES DAILY
Qty: 60 TABLET | Refills: 4 | Status: SHIPPED | OUTPATIENT
Start: 2025-06-02

## 2025-06-02 NOTE — TELEPHONE ENCOUNTER
Reason for call:   [x] Refill   [] Prior Auth  [] Other: pharmacist stated pt told her she has issues getting her medications so she's asking to them scripts on file     Office:   [] PCP/Provider -   [x] Specialty/Provider - neuro    Medication: lamotrigine 200 mg take 2 times daily    Briviact 50 mg take 2 times daily         Quantity: 60 for both     Pharmacy: [facility]   Does the patient have enough for 3 days?   [x] Yes   [] No - Send as HP to POD    [facility]   Does the patient have enough for 10 days?   [] Yes   [] No - Send as HP to POD

## 2025-06-02 NOTE — TELEPHONE ENCOUNTER
Pt called in to find out the status of her refill request. Tamika received call, but was checking in a patient - Tamika vazquez transferred call to me.     Pt proceeded to yell at me stating she called this weekend and lex stated she would personally call Dr. Marmolejo and was not sure why nothing was finished on Saturday. I did apologized to pt and let them know that I would send an urgent message to Dr. Marmolejo today. Pt proceeded to curse at me and stated that the stress I was causing her would result in her having a seizure. Pt also threatened she would come to the office and cause a problem if I did not solve this issue over the phone.     I will be letting Dr. Marmolejo know that pt called asking for med refill.